# Patient Record
Sex: FEMALE | HISPANIC OR LATINO | Employment: OTHER | URBAN - METROPOLITAN AREA
[De-identification: names, ages, dates, MRNs, and addresses within clinical notes are randomized per-mention and may not be internally consistent; named-entity substitution may affect disease eponyms.]

---

## 2022-08-29 ENCOUNTER — OFFICE VISIT (OUTPATIENT)
Dept: URGENT CARE | Facility: CLINIC | Age: 42
End: 2022-08-29

## 2022-08-29 VITALS
SYSTOLIC BLOOD PRESSURE: 152 MMHG | RESPIRATION RATE: 18 BRPM | DIASTOLIC BLOOD PRESSURE: 84 MMHG | TEMPERATURE: 96.5 F | OXYGEN SATURATION: 100 % | HEART RATE: 103 BPM

## 2022-08-29 DIAGNOSIS — M79.662 PAIN OF LEFT CALF: Primary | ICD-10-CM

## 2022-08-29 PROCEDURE — 99213 OFFICE O/P EST LOW 20 MIN: CPT | Performed by: PHYSICIAN ASSISTANT

## 2022-08-29 NOTE — PROGRESS NOTES
[facility]        NAME: Tasia Thomson is a 43 y o  female  : 1980    MRN: [id number]  DATE: 2022  TIME: 5:36 PM    Assessment and Plan   Pain of left calf [M79 662]  1  Pain of left calf       Patient Instructions   Left calf pain improving  Could have been cyst on ligament that popped  RICE- rest, ice (20 min on, 20 min off), compression with ace bandage, and elevation while at home  Ibuprofen as needed for pain  If no improvement f/u with ortho     Follow up with PCP in 3-5 days  Proceed to  ER if symptoms worsen  Chief Complaint     Chief Complaint   Patient presents with    Leg Pain     C/O left calf pain x 1 1/2 months, today heard a "pop", and increased pain          History of Present Illness       Niyah Dodd is a 51-year-old female who presents to clinic complaining of left calf pain intermittently x2 months however this morning when she is walking she felt a pop in the left calf and then notice more consistent and increased pain  She denies any pain with movement or range of motion but notes increased pain with bearing weight and walking  She denies any swelling, bruising, or redness  Review of Systems   Review of Systems   Constitutional: Negative for chills and fever  Musculoskeletal: Positive for gait problem and myalgias  Negative for arthralgias and joint swelling  Skin: Negative for color change and wound  Current Medications     No current outpatient medications on file  Current Allergies     Allergies as of 2022    (No Known Allergies)            The following portions of the patient's history were reviewed and updated as appropriate: allergies, current medications, past family history, past medical history, past social history, past surgical history and problem list      History reviewed  No pertinent past medical history  History reviewed  No pertinent surgical history  No family history on file        Medications have been verified  Objective   /84   Pulse 103   Temp (!) 96 5 °F (35 8 °C)   Resp 18   LMP 08/19/2022   SpO2 100%   Patient's last menstrual period was 08/19/2022  Physical Exam     Physical Exam  Vitals and nursing note reviewed  Constitutional:       General: She is not in acute distress  Appearance: Normal appearance  She is not ill-appearing  Cardiovascular:      Rate and Rhythm: Normal rate and regular rhythm  Heart sounds: Normal heart sounds  Pulmonary:      Effort: Pulmonary effort is normal       Breath sounds: Normal breath sounds  Musculoskeletal:      Left knee: Normal       Left lower leg: Tenderness present  No swelling, deformity, lacerations or bony tenderness  No edema  Neurological:      Mental Status: She is alert and oriented to person, place, and time     Psychiatric:         Mood and Affect: Mood normal          Behavior: Behavior normal

## 2023-03-28 ENCOUNTER — TELEPHONE (OUTPATIENT)
Dept: PAIN MEDICINE | Facility: MEDICAL CENTER | Age: 43
End: 2023-03-28

## 2023-03-28 ENCOUNTER — OFFICE VISIT (OUTPATIENT)
Dept: FAMILY MEDICINE CLINIC | Facility: CLINIC | Age: 43
End: 2023-03-28

## 2023-03-28 VITALS
TEMPERATURE: 98.5 F | WEIGHT: 172.8 LBS | RESPIRATION RATE: 14 BRPM | DIASTOLIC BLOOD PRESSURE: 86 MMHG | SYSTOLIC BLOOD PRESSURE: 156 MMHG | HEIGHT: 60 IN | BODY MASS INDEX: 33.92 KG/M2 | HEART RATE: 98 BPM

## 2023-03-28 DIAGNOSIS — R03.0 ELEVATED BLOOD PRESSURE READING: ICD-10-CM

## 2023-03-28 DIAGNOSIS — M54.42 ACUTE LEFT-SIDED LOW BACK PAIN WITH LEFT-SIDED SCIATICA: Primary | ICD-10-CM

## 2023-03-28 DIAGNOSIS — M54.10 BACK PAIN WITH LEFT-SIDED RADICULOPATHY: ICD-10-CM

## 2023-03-28 DIAGNOSIS — M25.562 ACUTE PAIN OF LEFT KNEE: ICD-10-CM

## 2023-03-28 PROBLEM — M50.00 HERNIATED NUCLEUS PULPOSUS WITH MYELOPATHY, CERVICAL: Status: ACTIVE | Noted: 2023-03-28

## 2023-03-28 RX ORDER — MELOXICAM 15 MG/1
15 TABLET ORAL DAILY
Qty: 30 TABLET | Refills: 0 | Status: SHIPPED | OUTPATIENT
Start: 2023-03-28

## 2023-03-28 RX ORDER — PREGABALIN 50 MG/1
50 CAPSULE ORAL 3 TIMES DAILY
Qty: 90 CAPSULE | Refills: 0 | Status: SHIPPED | OUTPATIENT
Start: 2023-03-28

## 2023-03-28 RX ORDER — PREGABALIN 50 MG/1
CAPSULE ORAL
COMMUNITY
Start: 2023-03-24 | End: 2023-03-28 | Stop reason: SDUPTHER

## 2023-03-28 NOTE — TELEPHONE ENCOUNTER
Caller: Chelo Carlos     Doctor/Office: not spa     Call regarding :  Ortho    Call was transferred to: Ortho

## 2023-03-28 NOTE — PROGRESS NOTES
Clinic Visit Note  Yoseph Solorio 43 y o  female   MRN: [id number]    Assessment and Plan      Diagnoses and all orders for this visit:    Acute left-sided low back pain with left-sided sciatica  Appears to be left-sided lower back pain with sciatica, no muscle weakness, no red flag symptoms today on exam, recommend continuing Lyrica as needed up to 3 times a day, meloxicam for anti-inflammatory relief, physical therapy for 4-6 weeks, if symptoms do not improve recommend advanced imaging  Patient does have follow-up with orthopedic surgery later today, appreciate recommendations  -     XR spine lumbar minimum 4 views non injury; Future  -     Ambulatory Referral to Physical Therapy; Future  -     meloxicam (Mobic) 15 mg tablet; Take 1 tablet (15 mg total) by mouth daily  -     Ambulatory Referral to Orthopedic Surgery; Future    Elevated blood pressure reading  Blood pressure elevated today in the setting of pain, recheck next visit to see if antihypertensive medication appropriate  Acute pain of left knee  -     Ambulatory Referral to Orthopedic Surgery; Future    Other orders  -     pregabalin (LYRICA) 50 mg capsule; TAKE 1 CAPSULE BY MOUTH THREE TIMES DAILY AS NEEDED NERVE PAIN      My impressions and treatment recommendations were discussed in detail with the patient who verbalized understanding and had no further questions  Discharge instructions were provided  Subjective     Chief Complaint: New patient visit    History of Present Illness:    Patient is a 59-year-old female coming in new patient encounter secondary to lower back pain with left-sided sciatica symptoms, previously saw care now, given gabapentin with some relief  She does also have follow-up with orthopedic surgery later today      The following portions of the patient's history were reviewed and updated as appropriate: allergies, current medications, past family history, past medical history, past social history, past surgical history and problem list     REVIEW OF SYSTEMS:  A complete 12-point review of systems is negative other than that noted in the HPI  Review of Systems   Constitutional: Negative for chills, fatigue and fever  Respiratory: Negative for cough, shortness of breath and wheezing  Cardiovascular: Negative for chest pain, palpitations and leg swelling  Genitourinary: Negative for difficulty urinating and hematuria  Musculoskeletal: Positive for back pain and neck pain  Negative for gait problem  Skin: Negative for rash and wound  Neurological: Negative for dizziness and headaches  Psychiatric/Behavioral: Negative for agitation and confusion  Current Outpatient Medications:   •  meloxicam (Mobic) 15 mg tablet, Take 1 tablet (15 mg total) by mouth daily, Disp: 30 tablet, Rfl: 0  •  pregabalin (LYRICA) 50 mg capsule, Take 1 capsule (50 mg total) by mouth 3 (three) times a day, Disp: 90 capsule, Rfl: 0  History reviewed  No pertinent past medical history  History reviewed  No pertinent surgical history    Social History     Socioeconomic History   • Marital status: Unknown     Spouse name: Not on file   • Number of children: Not on file   • Years of education: Not on file   • Highest education level: Not on file   Occupational History   • Not on file   Tobacco Use   • Smoking status: Never   • Smokeless tobacco: Never   Vaping Use   • Vaping Use: Never used   Substance and Sexual Activity   • Alcohol use: Never   • Drug use: Never   • Sexual activity: Not on file   Other Topics Concern   • Not on file   Social History Narrative   • Not on file     Social Determinants of Health     Financial Resource Strain: Not on file   Food Insecurity: Not on file   Transportation Needs: Not on file   Physical Activity: Not on file   Stress: Not on file   Social Connections: Not on file   Intimate Partner Violence: Not on file   Housing Stability: Not on file     Family History   Problem Relation Age of Onset   • Stroke Mother    • Heart attack Mother    • Cancer Father    • Diabetes Maternal Grandmother      No Known Allergies    Objective     Vitals:    03/28/23 1353   BP: 156/86   BP Location: Left arm   Patient Position: Sitting   Cuff Size: Large   Pulse: 98   Resp: 14   Temp: 98 5 °F (36 9 °C)   TempSrc: Temporal   Weight: 78 4 kg (172 lb 12 8 oz)   Height: 5' (1 524 m)       Physical Exam:     GENERAL: NAD, pleasant   HEENT:  NC/AT, PERRL, EOMI, no scleral icterus  CARDIAC:  RRR, +S1/S2, no S3/S4 appreciated, no m/g/r  PULMONARY:  CTA B/L, no wheezing/rales/rhonci, non-labored breathing  ABDOMEN:  Soft, NT/ND, no rebound/guarding/rigidity  Extremities:  No edema, cyanosis, or clubbing, positive straight leg test left side  Musculoskeletal:  Full range of motion intact in all extremities   NEUROLOGIC: Grossly intact, no focal deficits  SKIN:  No rashes or erythema noted on exposed skin  Psych: Normal affect, mood stable    ==  PLEASE NOTE:  This encounter was completed utilizing the [employer] Direct Speech Voice Recognition Software  Grammatical errors, random word insertions, pronoun errors and incomplete sentences are occasional consequences of the system due to software limitations, ambient noise and hardware issues  These may be missed by proof reading prior to affixing electronic signature  Any questions or concerns about the content, text or information contained within the body of this dictation should be directly addressed to the physician for clarification  Please do not hesitate to call me directly if you have any any questions or concerns      DO Brian Chapman [facility] Internal Medicine   [facility]

## 2023-03-29 ENCOUNTER — TELEPHONE (OUTPATIENT)
Dept: OBGYN CLINIC | Facility: HOSPITAL | Age: 43
End: 2023-03-29

## 2023-03-29 NOTE — TELEPHONE ENCOUNTER
Caller: Nisa Dexter    Doctor: n/a    Reason for call: patient needed PT, transferred call    Call back#: [phone]

## 2023-04-06 ENCOUNTER — EVALUATION (OUTPATIENT)
Dept: PHYSICAL THERAPY | Facility: CLINIC | Age: 43
End: 2023-04-06

## 2023-04-06 DIAGNOSIS — M54.16 LUMBAR RADICULOPATHY: Primary | ICD-10-CM

## 2023-04-06 NOTE — PROGRESS NOTES
PT Evaluation     Today's date: 2023  Patient name: Huang Roque  : 1980  MRN: [id number]  Referring provider: Namrata Bird MD  Dx:   Encounter Diagnosis     ICD-10-CM    1  Lumbar radiculopathy  M54 16                      Assessment  Assessment details: 2023: Pt presents with constant L groin,calf pain and intermittent back/thigh pain signs and symptoms present for ,ptr than 16 days, are below to the knee which are not changing much in the past year and are consistent with derangement which would benefit from directional specific mechanical correction exercises  Symptoms respond with centralization to L buttock from L lateral knee upon mechanical assessment to determine directional preference of exension  Pt will benefit from skilled physical therapy 1-2X/week for 8-12 weeks to address deficits in range of motion, strength and function and return to PLOF by reaching short and long term goals  Impairments: abnormal or restricted ROM, activity intolerance, lacks appropriate home exercise program, pain with function, poor posture  and poor body mechanics  Barriers to therapy: High co pay  Understanding of Dx/Px/POC: good   Prognosis: good    Goals  Short Tem Goals to be met in 4 weeks (target date 2023)  1  Pt to be independent w/ HEP  2  Reduce c/o pain to 0-4/10 with activity and prolonged positions  3  Restore lumbar AROM to [facility] w/o pain  4  Pt to report pain to be intermittent vs current constant  Long Term Goal to be met in 12 weeks (target date 2023)  1  Pt to achieve full symptom prevention/resolution via HEP  2  Pt to resume PLOF regarding ADL's and IADL's, improving FOTO score to 59 or better and pain of 0-2/10  3  Pt to report sleep no longer disturbed by pain  4  Pt to demonstrate B/L LE strength of 5/5 to allow squatting/lifting w/ good mechanics        Plan  Patient would benefit from: skilled speech therapy  Planned modality interventions: traction, unattended electrical stimulation and thermotherapy: hydrocollator packs  Planned therapy interventions: joint mobilization, manual therapy, abdominal trunk stabilization, patient education, postural training, stretching, transfer training, home exercise program and strengthening  Frequency: 1-2x/week  Plan of Care beginning date: 2023  Plan of Care expiration date: 2023  Treatment plan discussed with: patient        Subjective Evaluation    History of Present Illness  Mechanism of injury: Subjective 2023: Pt is a  for [employer]  She does a lot of standing, lifting and carrying  Her back pain began about a year ago, in her L calf  It later spread to the back of her thigh and buttock  Then it spread to her groin  Recently it has spread across her low back  Recently the groin pain and calf pain are constant, and back/posterior thigh pain are intermittent  She denies R LE symptoms other than intermittent R toes paresthesias  Pain is worse after prolonged sitting  She cannot sleep on her side, so her sleep is disturbed if she tries to roll off her back  She reports less pain after standing for a while     Pain  At best pain rating: 10  At worst pain ratin  Quality: burning, sharp and dull ache (paresthesias)  Relieving factors: medications  Progression: improved (only w/ medication)    Social Support    Employment status: working ([employer] manager)  Treatments  Previous treatment: chiropractic (no better)  Current treatment: physical therapy  Patient Goals  Patient goals for therapy: decreased pain, independence with ADLs/IADLs and increased motion  Patient goal: sleep w/o waking due to pain        Objective  SPECIAL TESTS     2023  SLR supine:   (- R/+ L)  Crossed SLR:   (- R/- L)  Prone instability test:  (-)  Prone hip IR ROM:  (-r/+L)  Aberrant motion/Eleanor's: (+)  Supine to sit test:  (-)  [place] test prone:  (-)  Slump test:   (-R/+L)  Femoral NTT:   (-R/-L)      DERMATOMAL TESTING: pinprick     2023  L2 anterior thigh:  WNL B/L  L3 medial knee:  WNL B/L  L4 medial maleolus:  Dec L  L5 1st web space: WNL B/L  S1 lateral/sole foot:  Dec L  S2 poster calf:  Dec L  Hip exten:  R 4+/5; L 3+/5    MYOTOMAL TESTIN2023     Right  Left  L2-3 Hip flexion:  *  L3-4 Knee extension:    4+/5  L5 Great toe exten:    L4 Heel walk/DF:    S1 toe walk/PF/ever:    S2 knee flex:       REFLEXES: 0= none; 1+ = slight; 2+ = brisk/normal; 3+= very brisk; 4+= clonus     2023  L3-4 Quadriceps:  1+ b/l  L5-S1 Achilles:  1+ b/l    SPINAL/PIAVM ASSESSMENT/PALPATION:   2023: Lumbar P to A segmental mobility min/mod limited w/o pain  LUMBAR AROM: 2023  Flexion   Mod* oscar  Extension  mod oscar  R sideglide  min oscar  L sideglide  min oscar    MECHANICAL ASSESSMENT:   2023: pre-test findings include 6/10 L groin and lateral knee pain  Repeated extension in lying (REIL) 2x10 = centralizing to L buttock/B 3-4/10  Repeated EIL from qped 1x10 =     FUNCTION:  2023: Pt is limited with tolerance to sitting and sleep is disturbed by pain  Access Code: [email or web address]  URL: [email or web address]  Date: 2023  Prepared by: Ron Fallon    Exercises  - Quadruped Hip Drops  - 6 x daily - 10 reps    Precautions: History reviewed  No pertinent past medical history     SOC: 2023  FOTO: 2023  POC EXP 2023  DAILY TREATMENT LOG    Manuals 2023                                       Neuro Re-Ed        REIL 2X10       EIL from qped 1x10 (prefers)       PAWEL w/ B/L knee flexion 2x10       bridges        pirif stretch        Sciatic NG supine        Pt educ lumbar roll; importance of HEP freq, limit flexed postures; disc mechanics TT       Ther Ex                                                                        Ther Activity                        Gait Training                        Modalities

## 2023-04-12 ENCOUNTER — APPOINTMENT (OUTPATIENT)
Dept: PHYSICAL THERAPY | Facility: CLINIC | Age: 43
End: 2023-04-12

## 2023-04-13 ENCOUNTER — APPOINTMENT (OUTPATIENT)
Dept: PHYSICAL THERAPY | Facility: CLINIC | Age: 43
End: 2023-04-13

## 2023-04-19 ENCOUNTER — APPOINTMENT (OUTPATIENT)
Dept: PHYSICAL THERAPY | Facility: CLINIC | Age: 43
End: 2023-04-19

## 2023-04-21 ENCOUNTER — APPOINTMENT (OUTPATIENT)
Dept: PHYSICAL THERAPY | Facility: CLINIC | Age: 43
End: 2023-04-21

## 2023-04-24 ENCOUNTER — APPOINTMENT (OUTPATIENT)
Dept: PHYSICAL THERAPY | Facility: CLINIC | Age: 43
End: 2023-04-24

## 2023-04-26 ENCOUNTER — APPOINTMENT (OUTPATIENT)
Dept: PHYSICAL THERAPY | Facility: CLINIC | Age: 43
End: 2023-04-26

## 2023-05-12 ENCOUNTER — TELEPHONE (OUTPATIENT)
Dept: FAMILY MEDICINE CLINIC | Facility: CLINIC | Age: 43
End: 2023-05-12

## 2023-05-12 NOTE — TELEPHONE ENCOUNTER
I spoke to Patient she stated that the pain that she was seen for 04/18 is changing and wanted new medication  I asked her how was it changing and she stated sh had walked to the bathroom and the pain was so bad she almost passed out  Itold her that under thoae circumstances she should go to the ER or UC

## 2023-05-12 NOTE — TELEPHONE ENCOUNTER
Called pt and pt unavailable  Hello, my name is Terry Merida  My phone number is [phone]  Would like to talk to Doctor [facility] please  Thank you

## 2023-05-15 ENCOUNTER — OFFICE VISIT (OUTPATIENT)
Dept: FAMILY MEDICINE CLINIC | Facility: CLINIC | Age: 43
End: 2023-05-15

## 2023-05-15 VITALS
BODY MASS INDEX: 34.16 KG/M2 | SYSTOLIC BLOOD PRESSURE: 132 MMHG | RESPIRATION RATE: 14 BRPM | HEIGHT: 60 IN | HEART RATE: 112 BPM | WEIGHT: 174 LBS | OXYGEN SATURATION: 99 % | TEMPERATURE: 99.5 F | DIASTOLIC BLOOD PRESSURE: 90 MMHG

## 2023-05-15 DIAGNOSIS — M54.42 ACUTE LEFT-SIDED LOW BACK PAIN WITH LEFT-SIDED SCIATICA: Primary | ICD-10-CM

## 2023-05-15 DIAGNOSIS — G57.02 PIRIFORMIS SYNDROME OF LEFT SIDE: ICD-10-CM

## 2023-05-15 RX ORDER — DEXAMETHASONE SODIUM PHOSPHATE 4 MG/ML
4 INJECTION, SOLUTION INTRA-ARTICULAR; INTRALESIONAL; INTRAMUSCULAR; INTRAVENOUS; SOFT TISSUE ONCE
Status: COMPLETED | OUTPATIENT
Start: 2023-05-15 | End: 2023-05-15

## 2023-05-15 RX ADMIN — DEXAMETHASONE SODIUM PHOSPHATE 4 MG: 4 INJECTION, SOLUTION INTRA-ARTICULAR; INTRALESIONAL; INTRAMUSCULAR; INTRAVENOUS; SOFT TISSUE at 13:06

## 2023-05-15 NOTE — PROGRESS NOTES
Clinic Visit Note  Rashida Owens 43 y o  female   MRN: [id number]    Assessment and Plan      Diagnoses and all orders for this visit:    Acute left-sided low back pain with left-sided sciatica  Recommend physical therapy evaluation, dexamethasone shot in office today to help with inflammatory acute response, continue Lyrica/Mobic medical therapy  Lower back pain with sciatica versus piriformis syndrome on left side, recommend x-ray imaging to rule out acute hip pathology  If symptoms worsen or not responding reevaluation recommended  -     Ambulatory Referral to Physical Therapy; Future  -     dexamethasone (DECADRON) injection 4 mg  -     XR hip/pelv 2-3 vws left if performed; Future    Piriformis syndrome of left side  -     dexamethasone (DECADRON) injection 4 mg  -     XR hip/pelv 2-3 vws left if performed; Future      My impressions and treatment recommendations were discussed in detail with the patient who verbalized understanding and had no further questions  Discharge instructions were provided  Subjective     Chief Complaint: Follow-up visit    History of Present Illness:    Patient is a 40-year-old female follow-up visit secondary to left-sided lower back pain  Patient notes this has been flaring for the last 48 hours, has not responded to Mobic and Lyrica like previously  Patient did have one session of physical therapy previously  Has been following orthopedics out of network  The following portions of the patient's history were reviewed and updated as appropriate: allergies, current medications, past family history, past medical history, past social history, past surgical history and problem list     REVIEW OF SYSTEMS:  A complete 12-point review of systems is negative other than that noted in the HPI  Review of Systems   Constitutional: Negative for chills, fatigue and fever  Respiratory: Negative for cough, shortness of breath and wheezing      Cardiovascular: Negative for chest pain, palpitations and leg swelling  Musculoskeletal: Positive for back pain  Negative for gait problem and myalgias  Neurological: Negative for dizziness and headaches  Current Outpatient Medications:   •  meloxicam (Mobic) 15 mg tablet, Take 1 tablet (15 mg total) by mouth daily, Disp: 30 tablet, Rfl: 0  •  pregabalin (LYRICA) 75 mg capsule, , Disp: , Rfl:   No current facility-administered medications for this visit  History reviewed  No pertinent past medical history  History reviewed  No pertinent surgical history    Social History     Socioeconomic History   • Marital status: Unknown     Spouse name: Not on file   • Number of children: Not on file   • Years of education: Not on file   • Highest education level: Not on file   Occupational History   • Not on file   Tobacco Use   • Smoking status: Never   • Smokeless tobacco: Never   Vaping Use   • Vaping Use: Never used   Substance and Sexual Activity   • Alcohol use: Never   • Drug use: Never   • Sexual activity: Not on file   Other Topics Concern   • Not on file   Social History Narrative   • Not on file     Social Determinants of Health     Financial Resource Strain: Not on file   Food Insecurity: Not on file   Transportation Needs: Not on file   Physical Activity: Not on file   Stress: Not on file   Social Connections: Not on file   Intimate Partner Violence: Not on file   Housing Stability: Not on file     Family History   Problem Relation Age of Onset   • Stroke Mother    • Heart attack Mother    • Cancer Father    • Diabetes Maternal Grandmother      No Known Allergies    Objective     Vitals:    05/15/23 1242   BP: 132/90   BP Location: Left arm   Patient Position: Sitting   Cuff Size: Adult   Pulse: (!) 112   Resp: 14   Temp: 99 5 °F (37 5 °C)   TempSrc: Temporal   SpO2: 99%   Weight: 78 9 kg (174 lb)   Height: 5' (1 524 m)       Physical Exam:     GENERAL: NAD, pleasant   HEENT:  NC/AT, PERRL, EOMI, no scleral icterus  CARDIAC:  RRR, +S1/S2, no S3/S4 appreciated, no m/g/r  PULMONARY:  CTA B/L, no wheezing/rales/rhonci, non-labored breathing  ABDOMEN:  Soft, NT/ND, no rebound/guarding/rigidity  Extremities:  No edema, cyanosis, or clubbing  Musculoskeletal: Limited flexion with abduction left hip  NEUROLOGIC: Grossly intact, no focal deficits  SKIN:  No rashes or erythema noted on exposed skin  Psych: Normal affect, mood stable    ==  PLEASE NOTE:  This encounter was completed utilizing the [employer] Direct Speech Voice Recognition Software  Grammatical errors, random word insertions, pronoun errors and incomplete sentences are occasional consequences of the system due to software limitations, ambient noise and hardware issues  These may be missed by proof reading prior to affixing electronic signature  Any questions or concerns about the content, text or information contained within the body of this dictation should be directly addressed to the physician for clarification  Please do not hesitate to call me directly if you have any any questions or concerns      DO Brian Salinas [facility] Internal Medicine   [facility]

## 2023-05-19 ENCOUNTER — EVALUATION (OUTPATIENT)
Dept: PHYSICAL THERAPY | Facility: CLINIC | Age: 43
End: 2023-05-19

## 2023-05-19 DIAGNOSIS — M50.00 HERNIATED NUCLEUS PULPOSUS WITH MYELOPATHY, CERVICAL: Primary | ICD-10-CM

## 2023-05-19 NOTE — PROGRESS NOTES
PT Evaluation     Today's date: 2023  Patient name: Lexa Trinidad  : 1980  MRN: [id number]  Referring provider: Mohit Solis*  Dx:   Encounter Diagnosis     ICD-10-CM    1  Herniated nucleus pulposus with myelopathy, cervical  M50 00                      Assessment  Assessment details: Lexa Trinidad is an 43 y o  female  arriving to clinic with complaints of left sided sciatica secondary to lumbar radiculopathy  Patient with pain limiting strength, (+) SLR LLE, (+) slump test  Pain rated at 7/10 in standing reduced and centralized to 3/10 following 20x TOBI  Patient educated on importance of compliance with with HEP, patient agreeable  Due to current deficits, patient is limited functionally with ADL's, recreational activities, work-related activities, engaging in social activities, ambulation, stair negotiation, lifting/carrying, transfers  Patient has been educated in home exercise program and plan of care  Patient would benefit from skilled physical therapy services to address their aforementioned functional limitations and progress towards prior level of function and independence with home exercise program     Impairments: abnormal gait, abnormal or restricted ROM, activity intolerance, impaired physical strength, lacks appropriate home exercise program, pain with function, weight-bearing intolerance and poor posture     Symptom irritability: high  Goals  Short Term Goals:  4 weeks  1  Initiate and advance home exercise program to self manage symptoms  2  Improve postural awareness and demonstrate self postural correction  3  Improve AROM lumbar spine to minimal limitation or better to improve mobility   4  Patient to reduce pain at worst to 4/10 at worst in LLE to improve activity tolerance  Long Term Goals:  12 weeks  1  Patient to exhibit full independence with home exercise program for symptoms relief and prevention   2   Patient to achieve lumbar ROM to [facility] without increased pain  3  Improve LE strength to 5/5 grossly to improve general mobility  4  Patient to reduce symptoms to 1/10 pain at worst in LLE    Plan  Patient would benefit from: skilled physical therapy  Planned modality interventions: TENS, unattended electrical stimulation, thermotherapy: hydrocollator packs and cryotherapy  Planned therapy interventions: abdominal trunk stabilization, flexibility, functional ROM exercises, home exercise program, therapeutic exercise, therapeutic activities, stretching, strengthening, self care, postural training, patient education, neuromuscular re-education, massage, manual therapy and joint mobilization  Frequency: 2x week  Duration in weeks: 12  Treatment plan discussed with: patient        Subjective Evaluation    History of Present Illness  Mechanism of injury: Patient reports she has a chronic history of left lower extremity pain down into her left calf  Patient notes that pain started about a year ago  Patient went to urgent care however was not given any medication  Patient reports symptoms are worse with sitting, standing, walking  Patient followed up with her PCP who recommended physical therapy  Patient went 1x however was not consistent  Patient returned back to her PCP who referred patient x-ray that was unremarkable  Patient was referred again to physical therapy            Recurrent probem    Pain  Current pain ratin  At best pain ratin  At worst pain rating: 10  Quality: radiating, discomfort, dull ache and sharp  Aggravating factors: sitting, standing and walking  Progression: worsening    Social Support    Employment status: working    Diagnostic Tests  X-ray: normal  Treatments  Previous treatment: physical therapy  Patient Goals  Patient goals for therapy: increased motion, decreased pain and increased strength          Objective  POSTURE:   Standing: Increased lumbar lordosis  Sitting: Increased lumbar lordosis     Lumbar AROM limitations:  (* = Pain)    Flexion                        Mod oscar*  Extension                    mod oscar  R sideglide                  min oscar  L sideglide                   min oscar        Mechanical Asessment: pre-test symtpoms include L groin and lateral knee pain                                                                Repeated extension in lying (REIL) 1x10 = Centralization to left posterior hip   Repeated TOBI 1x10 =  Centralization to posterior hip     Strength:                                         Right                Left     Hip flexion:           5/5                   3+/5*   Knee extension:   5/5                   4/5*   Great toe exten:      5/5                   5/5   Dorsiflexion:         5/5                   5/5   Plantarflexion:    5/5                   5/5   Knee flex:               5/5                   5/5      SPECIAL TESTS:                                  SLR supine:( +) L  Crossed SLR: (-)  Prone instability test: (-)  Slump test: (+) L minimal  Femoral NTT: (-)      FUNCTION:    Prolonged sitting, sleeping    DTR's:   Patella R: 1+  Patella L: 1+  Achilles R: 1+  Achilles L: 1+       Precautions: History reviewed  No pertinent past medical history  Date: 05/19/2023       Visit # 1 Eval       Manuals                                        Neuro Re-Ed                                                                Ther Ex        PAWEL        PAWEL with knee bends        TOBI                                                Ther Activity                        Gait Training                        Modalities                          Access Code: [email or web address]  URL: [email or web address]  Date: 05/19/2023  Prepared by: Lars Skelton    Exercises  - Standing Lumbar Extension with Counter  - 4 x daily - 7 x weekly - 2 sets - 10 reps

## 2023-05-23 ENCOUNTER — OFFICE VISIT (OUTPATIENT)
Dept: PHYSICAL THERAPY | Facility: CLINIC | Age: 43
End: 2023-05-23

## 2023-05-23 DIAGNOSIS — M50.00 HERNIATED NUCLEUS PULPOSUS WITH MYELOPATHY, CERVICAL: Primary | ICD-10-CM

## 2023-05-23 NOTE — PROGRESS NOTES
"Daily Note     Today's date: 2023  Patient name: Kemar Alfaro  : 1980  MRN: [id number]  Referring provider: Abril Jackson*  Dx:   Encounter Diagnosis     ICD-10-CM    1  Herniated nucleus pulposus with myelopathy, cervical  M50 00                      Subjective: Patient reports that she has been compliant with HEP and states symptoms have improved somewhat noting that the acuity has not been as bad  Objective: See treatment diary below      Assessment: Tolerated treatment fair  Patient was guarded during session presenting with apprehension for movement in expectation of pain, no pain during session  Patient exhibited good technique with therapeutic exercises and would benefit from continued PT      Plan: Continue per plan of care  Precautions: History reviewed  No pertinent past medical history  Date: 2023      Visit # 1 Eval 2      Manuals        Sciatic nerve glides  LLE 20x                               Neuro Re-Ed        Sidelying clamshells  In right sidelying 2x10      Supine clamshells  GTB 20x with cues for slowed eccentric and equal force      Bridging  2x10                                      Ther Ex        TOBI  20x beginning of session  20x end of session      PAWEL with knee bends  Bilateral knee bends 20x      SLR  LLE 10x2       Adduction squeeze  3\" holds 20x      LTR  20x with adduction squeeze      Patient education  HEP, exercise progression                      Ther Activity                        Gait Training                        Modalities                          Access Code: [email or web address]  URL: [email or web address]  Date: 2023  Prepared by: Rene Arms    Exercises  - Standing Lumbar Extension with Counter  - 4 x daily - 7 x weekly - 2 sets - 10 reps         "

## 2023-05-28 DIAGNOSIS — M54.42 ACUTE LEFT-SIDED LOW BACK PAIN WITH LEFT-SIDED SCIATICA: ICD-10-CM

## 2023-05-30 RX ORDER — MELOXICAM 15 MG/1
TABLET ORAL
Qty: 30 TABLET | Refills: 0 | Status: SHIPPED | OUTPATIENT
Start: 2023-05-30

## 2023-05-31 ENCOUNTER — TELEPHONE (OUTPATIENT)
Dept: FAMILY MEDICINE CLINIC | Facility: CLINIC | Age: 43
End: 2023-05-31

## 2023-05-31 NOTE — TELEPHONE ENCOUNTER
Patient left voicemail requesting a refill of her muscle relaxer however I reviewed the pts chart she is not taking one  Dr Kimberly Jenkins did send over [employer] for the Patient I tried to phone the Patient but there was no answer nor a vm to leave a message

## 2023-06-01 ENCOUNTER — APPOINTMENT (OUTPATIENT)
Dept: PHYSICAL THERAPY | Facility: CLINIC | Age: 43
End: 2023-06-01
Payer: COMMERCIAL

## 2023-06-05 ENCOUNTER — OFFICE VISIT (OUTPATIENT)
Dept: PHYSICAL THERAPY | Facility: CLINIC | Age: 43
End: 2023-06-05
Payer: COMMERCIAL

## 2023-06-05 DIAGNOSIS — M50.00 HERNIATED NUCLEUS PULPOSUS WITH MYELOPATHY, CERVICAL: Primary | ICD-10-CM

## 2023-06-05 PROCEDURE — 97112 NEUROMUSCULAR REEDUCATION: CPT | Performed by: PHYSICAL MEDICINE & REHABILITATION

## 2023-06-05 PROCEDURE — 97140 MANUAL THERAPY 1/> REGIONS: CPT | Performed by: PHYSICAL MEDICINE & REHABILITATION

## 2023-06-05 PROCEDURE — 97110 THERAPEUTIC EXERCISES: CPT | Performed by: PHYSICAL MEDICINE & REHABILITATION

## 2023-06-05 NOTE — PROGRESS NOTES
"Daily Note     Today's date: 2023  Patient name: Gurpreet Wade  : 1980  MRN: [id number]  Referring provider: Marjan Feliz*  Dx:   Encounter Diagnosis     ICD-10-CM    1  Herniated nucleus pulposus with myelopathy, cervical  M50 00                      Subjective: Patient reports symptoms are better by about 15%  Patient reports that her left leg continues to feel heavy and states that there is pain when she lifts her left leg  Objective: See treatment diary below      Assessment: Tolerated treatment fair  Patient does respond well to lumbar extension, however does continue to be very pain sensitive  Patient exhibited good technique with therapeutic exercises and would benefit from continued PT      Plan: Continue per plan of care  Precautions: History reviewed  No pertinent past medical history  Date: 2023     Visit # 1 Eval 2 3     Manuals        Sciatic nerve glides  LLE 20x  LLE gently 20x                             Neuro Re-Ed        Sidelying clamshells  In right sidelying 2x10 ---     Supine clamshells  GTB 20x with cues for slowed eccentric and equal force GTB 20x with cues for slowed eccentric and equal force     Bridging  2x10 2x10                                     Ther Ex        TOBI  20x beginning of session  20x end of session 20x beginning of session  20x end of session     PAWEL with knee bends  Bilateral knee bends 20x PAWEL 2', 10x knee bends     SLR  LLE 10x2  LLE 10x2      Adduction squeeze  3\" holds 20x 3\" holds 20x     LTR  20x with adduction squeeze 20x with adduction squeeze     FSU/LSU   6\" 2x10 R             Patient education  HEP, exercise progression                      Ther Activity                        Gait Training                        Modalities                          Access Code: [email or web address]  URL: [email or web address]  Date: 2023  Prepared by: Vitor Hutchinson    Exercises  - Standing Lumbar " Extension with Counter  - 4 x daily - 7 x weekly - 2 sets - 10 reps

## 2023-06-12 ENCOUNTER — OFFICE VISIT (OUTPATIENT)
Dept: PHYSICAL THERAPY | Facility: CLINIC | Age: 43
End: 2023-06-12
Payer: COMMERCIAL

## 2023-06-12 DIAGNOSIS — M50.00 HERNIATED NUCLEUS PULPOSUS WITH MYELOPATHY, CERVICAL: Primary | ICD-10-CM

## 2023-06-12 PROCEDURE — 97112 NEUROMUSCULAR REEDUCATION: CPT | Performed by: PHYSICAL MEDICINE & REHABILITATION

## 2023-06-12 PROCEDURE — 97110 THERAPEUTIC EXERCISES: CPT | Performed by: PHYSICAL MEDICINE & REHABILITATION

## 2023-06-12 PROCEDURE — 97140 MANUAL THERAPY 1/> REGIONS: CPT | Performed by: PHYSICAL MEDICINE & REHABILITATION

## 2023-06-12 NOTE — PROGRESS NOTES
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 she began to have flair ups of symptoms because of sitting  Objective: See treatment diary below      Assessment: Tolerated treatment fair  Patient with poor tolerance to sciatic nerve glides today, no significant improvement in LLE symptoms following PAWEL, or TOBI  Patient is able to complete functional strengthening activities that did result in improvement in symptoms acutely  Advised patient on updated HEP  Patient exhibited good technique with therapeutic exercises and would benefit from continued PT      Plan: Continue per plan of care  Precautions: History reviewed  No pertinent past medical history      Date: 2023    Visit # 1 Eval 2 3 4    Manuals        Sciatic nerve glides  LLE 20x  LLE gently 20x 10x poor tolerance                            Neuro Re-Ed        Sidelying clamshells  In right sidelying 2x10 --- In right sidelying 2x10    Supine clamshells  GTB 20x with cues for slowed eccentric and equal force GTB 20x with cues for slowed eccentric and equal force GTB 20x    Bridging  2x10 2x10 2x10 with adduction squeeze                                    Ther Ex        TOBI  20x beginning of session  20x end of session 20x beginning of session  20x end of session 20x beginning of session  20x end of session    PAWEL with knee bends  Bilateral knee bends 20x PAWEL 2', 10x knee bends PAWLE 2', 10x knee bends    SLR  LLE 10x2  LLE 10x2      Adduction squeeze  3\" holds 20x 3\" holds 20x 3\" holds 20x    LTR  20x " "with adduction squeeze 20x with adduction squeeze 20x with adduction squeeze    FSU/LSU   6\" 2x10 R 6\" 2x10 R    Standing hip abd/ext    No resistance 20x each R/L    Heel raises    On half foam 20x    STS    From chair with airex 10x    Patient education  HEP, exercise progression                      Ther Activity                        Gait Training                        Modalities                          Access Code: [email or web address]  URL: [email or web address]  Date: 06/12/2023  Prepared by: Dave Luna    Exercises  - Standing Lumbar Extension with Counter  - 4 x daily - 7 x weekly - 2 sets - 10 reps  - Sit to Stand  - 1 x daily - 7 x weekly - 1 sets - 10 reps  - Standing Hip Abduction with Counter Support  - 1 x daily - 7 x weekly - 2 sets - 10 reps  - Standing Hip Extension with Counter Support  - 1 x daily - 7 x weekly - 2 sets - 10 reps  - Supine Lower Trunk Rotation  - 1 x daily - 7 x weekly - 2 sets - 10 reps  - Supine Bridge  - 1 x daily - 7 x weekly - 2 sets - 10 reps         "

## 2023-06-19 ENCOUNTER — OFFICE VISIT (OUTPATIENT)
Dept: PHYSICAL THERAPY | Facility: CLINIC | Age: 43
End: 2023-06-19
Payer: COMMERCIAL

## 2023-06-19 DIAGNOSIS — M50.00 HERNIATED NUCLEUS PULPOSUS WITH MYELOPATHY, CERVICAL: Primary | ICD-10-CM

## 2023-06-19 PROCEDURE — 97110 THERAPEUTIC EXERCISES: CPT | Performed by: PHYSICAL MEDICINE & REHABILITATION

## 2023-06-19 PROCEDURE — 97112 NEUROMUSCULAR REEDUCATION: CPT | Performed by: PHYSICAL MEDICINE & REHABILITATION

## 2023-06-19 PROCEDURE — 97140 MANUAL THERAPY 1/> REGIONS: CPT | Performed by: PHYSICAL MEDICINE & REHABILITATION

## 2023-06-19 NOTE — PROGRESS NOTES
"Daily Note     Today's date: 2023  Patient name: Bon Lindsay  : 1980  MRN: [id number]  Referring provider: Gela Massey*  Dx:   Encounter Diagnosis     ICD-10-CM    1  Herniated nucleus pulposus with myelopathy, cervical  M50 00                      Subjective: Patient reports symptoms continue especially with work related activities  Patient states prolonged sitting worsens symptoms  Objective: See treatment diary below      Assessment: Tolerated treatment fair  Patient presents with increased pain when moving into hip IR at >90 degrees of hip flexion with pain down leg, advised patient on continuing to strengthen hips with HEP  Patient exhibited good technique with therapeutic exercises and would benefit from continued PT      Plan: Continue per plan of care  Precautions: History reviewed  No pertinent past medical history      Date: 2023   Visit # 1 Eval 2 3 4 5    Manuals        Sciatic nerve glides  LLE 20x  LLE gently 20x 10x poor tolerance                            Neuro Re-Ed        Sidelying clamshells  In right sidelying 2x10 --- In right sidelying 2x10    Supine clamshells  GTB 20x with cues for slowed eccentric and equal force GTB 20x with cues for slowed eccentric and equal force GTB 20x GTB 20x   Bridging  2x10 2x10 2x10 with adduction squeeze 2x10 with adduction squeeze   Piriformis stretch     20\" holds 3x                           Ther Ex        TOBI  20x beginning of session  20x end of session 20x beginning of session  20x end of session 20x beginning of session  20x end of session 20x beginning of session  20x end of session   PAWEL with knee bends  Bilateral knee bends 20x PAWEL 2', 10x knee bends PAWEL 2', 10x knee bends PAWEL 2', 10x knee bends   SLR  LLE 10x2  LLE 10x2      Adduction squeeze  3\" holds 20x 3\" holds 20x 3\" holds 20x 3\" holds 20x   LTR  20x with adduction squeeze 20x with adduction squeeze 20x with " "adduction squeeze 20x with adduction squeeze   FSU/LSU   6\" 2x10 R 6\" 2x10 R 6\" 2x10 R   Standing hip abd/ext    No resistance 20x each R/L No resistance 20x each R/L   Heel raises    On half foam 20x    STS    From chair with airex 10x From chair with airex 10x   Patient education  HEP, exercise progression                      Ther Activity                        Gait Training                        Modalities                          Access Code: [email or web address]  URL: [email or web address]  Date: 06/12/2023  Prepared by: Jose Seth    Exercises  - Standing Lumbar Extension with Counter  - 4 x daily - 7 x weekly - 2 sets - 10 reps  - Sit to Stand  - 1 x daily - 7 x weekly - 1 sets - 10 reps  - Standing Hip Abduction with Counter Support  - 1 x daily - 7 x weekly - 2 sets - 10 reps  - Standing Hip Extension with Counter Support  - 1 x daily - 7 x weekly - 2 sets - 10 reps  - Supine Lower Trunk Rotation  - 1 x daily - 7 x weekly - 2 sets - 10 reps  - Supine Bridge  - 1 x daily - 7 x weekly - 2 sets - 10 reps         "

## 2023-06-22 ENCOUNTER — APPOINTMENT (OUTPATIENT)
Dept: PHYSICAL THERAPY | Facility: CLINIC | Age: 43
End: 2023-06-22
Payer: COMMERCIAL

## 2023-06-27 DIAGNOSIS — M54.42 ACUTE LEFT-SIDED LOW BACK PAIN WITH LEFT-SIDED SCIATICA: ICD-10-CM

## 2023-06-27 RX ORDER — MELOXICAM 15 MG/1
15 TABLET ORAL DAILY
Qty: 30 TABLET | Refills: 0 | Status: SHIPPED | OUTPATIENT
Start: 2023-06-27

## 2023-06-28 ENCOUNTER — OFFICE VISIT (OUTPATIENT)
Dept: PHYSICAL THERAPY | Facility: CLINIC | Age: 43
End: 2023-06-28
Payer: COMMERCIAL

## 2023-06-28 DIAGNOSIS — M50.00 HERNIATED NUCLEUS PULPOSUS WITH MYELOPATHY, CERVICAL: Primary | ICD-10-CM

## 2023-06-28 PROCEDURE — 97112 NEUROMUSCULAR REEDUCATION: CPT | Performed by: PHYSICAL MEDICINE & REHABILITATION

## 2023-06-28 PROCEDURE — 97110 THERAPEUTIC EXERCISES: CPT | Performed by: PHYSICAL MEDICINE & REHABILITATION

## 2023-06-28 NOTE — PROGRESS NOTES
"Daily Note     Today's date: 2023  Patient name: Vincenzo Williamson  : 1980  MRN: [id number]  Referring provider: Kenia Holbrook*  Dx:   Encounter Diagnosis     ICD-10-CM    1  Herniated nucleus pulposus with myelopathy, cervical  M50 00                      Subjective: Patient reports that she can feel her LLE getting stronger with activities such as stair negotiation and transfers of which she was having difficulties with in the past  Patient reports however pain down her left leg persists with sitting during her commute and when while at work  Objective: See treatment diary below      Assessment: Tolerated treatment fair  Patient has responded well to improved function with physical therapy however pain is persistent  Advised patient to follow up with MD due to persistent symptoms of pain  Patient exhibited good technique with therapeutic exercises and would benefit from continued PT      Plan: Continue per plan of care  Precautions: History reviewed  No pertinent past medical history      Date: 2023   Visit # 6 2 3 4 5    Manuals        Sciatic nerve glides  LLE 20x  LLE gently 20x 10x poor tolerance    LLE stretching L piriformis                       Neuro Re-Ed        Sidelying clamshells  In right sidelying 2x10 --- In right sidelying 2x10    Supine clamshells GTB 20x GTB 20x with cues for slowed eccentric and equal force GTB 20x with cues for slowed eccentric and equal force GTB 20x GTB 20x   Bridging 2x10 with adduction squeeze 2x10 2x10 2x10 with adduction squeeze 2x10 with adduction squeeze   Piriformis stretch 20\" holds 3x    20\" holds 3x                           Ther Ex        TOBI 20x beginning of session  20x end of session 20x beginning of session  20x end of session 20x beginning of session  20x end of session 20x beginning of session  20x end of session 20x beginning of session  20x end of session   PAWEL with knee " "bends PAWEL 2', 10x knee bends Bilateral knee bends 20x PAWEL 2', 10x knee bends PAWEL 2', 10x knee bends PAWEL 2', 10x knee bends   SLR  LLE 10x2  LLE 10x2      Adduction squeeze 3\" holds 20x 3\" holds 20x 3\" holds 20x 3\" holds 20x 3\" holds 20x   LTR 20x with adduction squeeze 20x with adduction squeeze 20x with adduction squeeze 20x with adduction squeeze 20x with adduction squeeze   FSU/LSU 6\" 2x10 R  6\" 2x10 R 6\" 2x10 R 6\" 2x10 R   Standing hip abd/ext No resistance 20x each R/L   No resistance 20x each R/L No resistance 20x each R/L   Heel raises From flat 20x   On half foam 20x    STS From chair 20x   From chair with airex 10x From chair with airex 10x   Patient education  HEP, exercise progression                      Ther Activity                        Gait Training                        Modalities                          Access Code: [email or web address]  URL: [email or web address]  Date: 06/12/2023  Prepared by: Justa Garza    Exercises  - Standing Lumbar Extension with Counter  - 4 x daily - 7 x weekly - 2 sets - 10 reps  - Sit to Stand  - 1 x daily - 7 x weekly - 1 sets - 10 reps  - Standing Hip Abduction with Counter Support  - 1 x daily - 7 x weekly - 2 sets - 10 reps  - Standing Hip Extension with Counter Support  - 1 x daily - 7 x weekly - 2 sets - 10 reps  - Supine Lower Trunk Rotation  - 1 x daily - 7 x weekly - 2 sets - 10 reps  - Supine Bridge  - 1 x daily - 7 x weekly - 2 sets - 10 reps         "

## 2023-09-19 ENCOUNTER — OFFICE VISIT (OUTPATIENT)
Dept: FAMILY MEDICINE CLINIC | Facility: CLINIC | Age: 43
End: 2023-09-19
Payer: COMMERCIAL

## 2023-09-19 VITALS
RESPIRATION RATE: 14 BRPM | TEMPERATURE: 98.6 F | DIASTOLIC BLOOD PRESSURE: 86 MMHG | SYSTOLIC BLOOD PRESSURE: 138 MMHG | BODY MASS INDEX: 33.38 KG/M2 | HEIGHT: 60 IN | HEART RATE: 92 BPM | WEIGHT: 170 LBS

## 2023-09-19 DIAGNOSIS — E55.9 VITAMIN D DEFICIENCY: ICD-10-CM

## 2023-09-19 DIAGNOSIS — Z13.29 THYROID DISORDER SCREENING: ICD-10-CM

## 2023-09-19 DIAGNOSIS — E53.8 B12 DEFICIENCY: ICD-10-CM

## 2023-09-19 DIAGNOSIS — Z13.1 DIABETES MELLITUS SCREENING: ICD-10-CM

## 2023-09-19 DIAGNOSIS — Z13.0 SCREENING, IRON DEFICIENCY ANEMIA: ICD-10-CM

## 2023-09-19 DIAGNOSIS — M25.552 CHRONIC LEFT HIP PAIN: Primary | ICD-10-CM

## 2023-09-19 DIAGNOSIS — E87.8 ELECTROLYTE ABNORMALITY: ICD-10-CM

## 2023-09-19 DIAGNOSIS — Z11.59 NEED FOR HEPATITIS C SCREENING TEST: ICD-10-CM

## 2023-09-19 DIAGNOSIS — G89.29 CHRONIC LEFT HIP PAIN: Primary | ICD-10-CM

## 2023-09-19 DIAGNOSIS — Z13.220 SCREENING CHOLESTEROL LEVEL: ICD-10-CM

## 2023-09-19 PROCEDURE — 99214 OFFICE O/P EST MOD 30 MIN: CPT | Performed by: STUDENT IN AN ORGANIZED HEALTH CARE EDUCATION/TRAINING PROGRAM

## 2023-09-19 RX ORDER — OMEPRAZOLE 20 MG/1
20 CAPSULE, DELAYED RELEASE ORAL DAILY
COMMUNITY
Start: 2023-07-27 | End: 2023-09-19

## 2023-09-19 NOTE — PROGRESS NOTES
Clinic Visit Note  Julito Oliver 37 y.o. female   MRN: [id number]    Assessment and Plan      Diagnoses and all orders for this visit:    Chronic left hip pain  Continue Mobic/Lyrica, will be following up with orthopedic hip specialist on October 3, appreciate recommendations, will most likely need some type of surgical procedure. Continue conservative management at this time, recommend yearly blood work prior to annual visit in 1 month. Need for hepatitis C screening test  -     Hepatitis C Antibody; Future    Thyroid disorder screening  -     TSH, 3rd generation with Free T4 reflex; Future    Diabetes mellitus screening  -     Hemoglobin A1C; Future    Screening, iron deficiency anemia  -     CBC and differential; Future    B12 deficiency  -     Vitamin B12; Future    Vitamin D deficiency  -     Vitamin D 25 hydroxy; Future    Electrolyte abnormality  -     Comprehensive metabolic panel; Future    Screening cholesterol level  -     Lipid panel; Future    My impressions and treatment recommendations were discussed in detail with the patient who verbalized understanding and had no further questions. Discharge instructions were provided. Subjective     Chief Complaint: F/U    History of Present Illness:    Patient is a 12-year-old female coming in for follow-up on left hip pain, will be seeing orthopedic specialist on October 3, significant pain at work. The following portions of the patient's history were reviewed and updated as appropriate: allergies, current medications, past family history, past medical history, past social history, past surgical history and problem list.    REVIEW OF SYSTEMS:  A complete 12-point review of systems is negative other than that noted in the HPI. Review of Systems   Constitutional: Negative for chills and fever. HENT: Negative for ear pain and sore throat. Eyes: Negative for pain and visual disturbance. Respiratory: Negative for cough and shortness of breath. Cardiovascular: Negative for chest pain and palpitations. Gastrointestinal: Negative for abdominal pain and vomiting. Genitourinary: Negative for dysuria and hematuria. Musculoskeletal: Positive for back pain. Negative for arthralgias. Chronic left hip pain   Skin: Negative for color change and rash. Neurological: Negative for seizures and syncope. All other systems reviewed and are negative. Current Outpatient Medications:   •  meloxicam (MOBIC) 15 mg tablet, Take 1 tablet (15 mg total) by mouth daily, Disp: 30 tablet, Rfl: 0  •  pregabalin (LYRICA) 75 mg capsule, Take 75 mg by mouth daily, Disp: , Rfl:   History reviewed. No pertinent past medical history. History reviewed. No pertinent surgical history.   Social History     Socioeconomic History   • Marital status: Unknown     Spouse name: Not on file   • Number of children: Not on file   • Years of education: Not on file   • Highest education level: Not on file   Occupational History   • Not on file   Tobacco Use   • Smoking status: Never   • Smokeless tobacco: Never   Vaping Use   • Vaping Use: Never used   Substance and Sexual Activity   • Alcohol use: Never   • Drug use: Never   • Sexual activity: Not on file   Other Topics Concern   • Not on file   Social History Narrative   • Not on file     Social Determinants of Health     Financial Resource Strain: Not on file   Food Insecurity: Not on file   Transportation Needs: Not on file   Physical Activity: Not on file   Stress: Not on file   Social Connections: Not on file   Intimate Partner Violence: Not on file   Housing Stability: Not on file     Family History   Problem Relation Age of Onset   • Stroke Mother    • Heart attack Mother    • Cancer Father    • Diabetes Maternal Grandmother      No Known Allergies    Objective     Vitals:    09/19/23 1007   BP: 138/86   BP Location: Left arm   Patient Position: Sitting   Cuff Size: Adult   Pulse: 92   Resp: 14   Temp: 98.6 °F (37 °C) TempSrc: Temporal   Weight: 77.1 kg (170 lb)   Height: 5' (1.524 m)       Physical Exam:     GENERAL: NAD, pleasant   HEENT:  NC/AT, PERRL, EOMI, no scleral icterus  CARDIAC:  RRR, +S1/S2, no S3/S4 appreciated, no m/g/r  PULMONARY:  CTA B/L, no wheezing/rales/rhonci, non-labored breathing  ABDOMEN:  Soft, NT/ND, no rebound/guarding/rigidity  Extremities:. No edema, cyanosis, or clubbing  Musculoskeletal:  Full range of motion intact in all extremities  NEUROLOGIC: Grossly intact, no focal deficits  SKIN:  No rashes or erythema noted on exposed skin  Psych: Normal affect, mood stable    ==  PLEASE NOTE:  This encounter was completed utilizing the [employer] Direct Speech Voice Recognition Software. Grammatical errors, random word insertions, pronoun errors and incomplete sentences are occasional consequences of the system due to software limitations, ambient noise and hardware issues. These may be missed by proof reading prior to affixing electronic signature. Any questions or concerns about the content, text or information contained within the body of this dictation should be directly addressed to the physician for clarification. Please do not hesitate to call me directly if you have any any questions or concerns.     DO [facility]   [facility]

## 2023-09-19 NOTE — LETTER
September 19, 2023     Patient: Parish Still  YOB: 1980  Date of Visit: 9/19/2023      To Whom it May Concern:    Parish Still is under my professional care. Jennifer Alfonso was seen in my office on 9/19/2023. Jennifer Alfonso may return to work on 09/26/2023 . If you have any questions or concerns, please don't hesitate to call.          Sincerely,          Adalid Lora,         CC: No Recipients

## 2023-09-28 DIAGNOSIS — M54.42 ACUTE LEFT-SIDED LOW BACK PAIN WITH LEFT-SIDED SCIATICA: ICD-10-CM

## 2023-09-28 RX ORDER — MELOXICAM 15 MG/1
15 TABLET ORAL DAILY
Qty: 30 TABLET | Refills: 0 | Status: SHIPPED | OUTPATIENT
Start: 2023-09-28

## 2023-10-25 DIAGNOSIS — M54.42 ACUTE LEFT-SIDED LOW BACK PAIN WITH LEFT-SIDED SCIATICA: ICD-10-CM

## 2023-10-25 RX ORDER — MELOXICAM 15 MG/1
15 TABLET ORAL DAILY
Qty: 30 TABLET | Refills: 1 | Status: SHIPPED | OUTPATIENT
Start: 2023-10-25

## 2023-12-20 NOTE — TELEPHONE ENCOUNTER
Patient would like a call when this is taken care.     Reason for call:   [x] Refill   [] Prior Auth  [] Other:     Office:   [x] PCP/Provider -   [] Specialty/Provider -     Medication: Pregabalin 75mg capsule- take by mouth daily     [facility]: [facility]    Does the patient have enough for 3 days?   [] Yes   [x] No - Send as HP to POD

## 2023-12-21 RX ORDER — PREGABALIN 75 MG/1
75 CAPSULE ORAL DAILY
Qty: 90 CAPSULE | Refills: 1 | OUTPATIENT
Start: 2023-12-21

## 2023-12-21 NOTE — TELEPHONE ENCOUNTER
Called patient. She confirmed that ortho Dr Cuellar has been prescribing this med, she is not sure dosage. Called [facility] and spoke with Vic- they will send refill request to Dr Cuellar. Please close out refill request.

## 2024-03-19 ENCOUNTER — TELEPHONE (OUTPATIENT)
Age: 44
End: 2024-03-19

## 2024-03-19 NOTE — TELEPHONE ENCOUNTER
Pt needed an appt and will come in on 3/22 with PCP . Please call pt to verify insurance and self pay cost. Pt was made aware she may or may not be a self pay. Please advise.

## 2024-03-22 ENCOUNTER — OFFICE VISIT (OUTPATIENT)
Dept: FAMILY MEDICINE CLINIC | Facility: CLINIC | Age: 44
End: 2024-03-22
Payer: COMMERCIAL

## 2024-03-22 VITALS
SYSTOLIC BLOOD PRESSURE: 140 MMHG | BODY MASS INDEX: 32.79 KG/M2 | RESPIRATION RATE: 14 BRPM | DIASTOLIC BLOOD PRESSURE: 80 MMHG | WEIGHT: 167 LBS | HEIGHT: 60 IN | HEART RATE: 88 BPM | TEMPERATURE: 98 F

## 2024-03-22 DIAGNOSIS — M25.552 CHRONIC LEFT HIP PAIN: Primary | ICD-10-CM

## 2024-03-22 DIAGNOSIS — E87.8 ELECTROLYTE ABNORMALITY: ICD-10-CM

## 2024-03-22 DIAGNOSIS — E55.9 VITAMIN D DEFICIENCY: ICD-10-CM

## 2024-03-22 DIAGNOSIS — E53.8 B12 DEFICIENCY: ICD-10-CM

## 2024-03-22 DIAGNOSIS — Z13.220 SCREENING CHOLESTEROL LEVEL: ICD-10-CM

## 2024-03-22 DIAGNOSIS — Z13.29 THYROID DISORDER SCREENING: ICD-10-CM

## 2024-03-22 DIAGNOSIS — G89.29 CHRONIC LEFT HIP PAIN: Primary | ICD-10-CM

## 2024-03-22 DIAGNOSIS — M54.16 LUMBAR RADICULOPATHY, CHRONIC: ICD-10-CM

## 2024-03-22 DIAGNOSIS — Z13.1 DIABETES MELLITUS SCREENING: ICD-10-CM

## 2024-03-22 DIAGNOSIS — N39.0 URINARY TRACT INFECTION WITHOUT HEMATURIA, SITE UNSPECIFIED: ICD-10-CM

## 2024-03-22 DIAGNOSIS — Z13.0 SCREENING, IRON DEFICIENCY ANEMIA: ICD-10-CM

## 2024-03-22 PROCEDURE — 99214 OFFICE O/P EST MOD 30 MIN: CPT | Performed by: STUDENT IN AN ORGANIZED HEALTH CARE EDUCATION/TRAINING PROGRAM

## 2024-03-22 RX ORDER — PREGABALIN 100 MG/1
100 CAPSULE ORAL
COMMUNITY
Start: 2023-12-21 | End: 2024-03-22 | Stop reason: SDUPTHER

## 2024-03-22 RX ORDER — PREGABALIN 100 MG/1
100 CAPSULE ORAL
Qty: 90 CAPSULE | Refills: 0 | Status: SHIPPED | OUTPATIENT
Start: 2024-03-22

## 2024-03-22 NOTE — PROGRESS NOTES
Clinic Visit Note  Olesya Negrete 43 y.o. female   MRN: [id number]    Assessment and Plan      Diagnoses and all orders for this visit:    Chronic left hip pain  Recommend follow-up with orthopedics, planned visit 3/28/2024, will most likely need steroid injection therapy, previous MRI, will obtain out of network imaging for evaluation    Urinary tract infection without hematuria, site unspecified  Symptoms improved, urgent care antibiotic    Thyroid disorder screening  -     TSH, 3rd generation with Free T4 reflex; Future    Diabetes mellitus screening  -     Hemoglobin A1C; Future    Screening, iron deficiency anemia  -     CBC and differential; Future    B12 deficiency  -     Vitamin B12; Future    Vitamin D deficiency  -     Vitamin D 25 hydroxy; Future    Electrolyte abnormality  -     Comprehensive metabolic panel; Future    Screening cholesterol level  -     Lipid panel; Future    Lumbar radiculopathy, chronic  -     pregabalin (LYRICA) 100 mg capsule; Take 1 capsule (100 mg total) by mouth daily at bedtime    Follow-up after orthopedics recommended, annual physical with blood work.    My impressions and treatment recommendations were discussed in detail with the patient who verbalized understanding and had no further questions.  Discharge instructions were provided.    Subjective     Chief Complaint: F/U    History of Present Illness:    Patient is a pleasant 43-year-old female presenting secondary to lumbar radiculopathy symptoms currently on Lyrica, chronic left hip pain.  Patient is following with out of network orthopedics recommending injection therapy.    The following portions of the patient's history were reviewed and updated as appropriate: allergies, current medications, past family history, past medical history, past social history, past surgical history and problem list.    REVIEW OF SYSTEMS:  A complete 12-point review of systems is negative other than that noted in the HPI.    Review of  Systems   Constitutional:  Negative for chills and fever.   HENT:  Negative for ear pain and sore throat.    Eyes:  Negative for pain and visual disturbance.   Respiratory:  Negative for cough and shortness of breath.    Cardiovascular:  Negative for chest pain and palpitations.   Gastrointestinal:  Negative for abdominal pain and vomiting.   Genitourinary:  Negative for dysuria and hematuria.   Musculoskeletal:  Positive for arthralgias and back pain. Negative for neck pain and neck stiffness.   Skin:  Negative for color change and rash.   Neurological:  Negative for seizures and syncope.   All other systems reviewed and are negative.        Current Outpatient Medications:   •  pregabalin (LYRICA) 100 mg capsule, Take 1 capsule (100 mg total) by mouth daily at bedtime, Disp: 90 capsule, Rfl: 0  History reviewed. No pertinent past medical history.  History reviewed. No pertinent surgical history.  Social History     Socioeconomic History   • Marital status: Unknown     Spouse name: Not on file   • Number of children: Not on file   • Years of education: Not on file   • Highest education level: Not on file   Occupational History   • Not on file   Tobacco Use   • Smoking status: Never   • Smokeless tobacco: Never   Vaping Use   • Vaping status: Never Used   Substance and Sexual Activity   • Alcohol use: Never   • Drug use: Never   • Sexual activity: Not on file   Other Topics Concern   • Not on file   Social History Narrative   • Not on file     Social Determinants of Health     Financial Resource Strain: Not on file   Food Insecurity: Not on file   Transportation Needs: Not on file   Physical Activity: Not on file   Stress: Not on file   Social Connections: Not on file   Intimate Partner Violence: Not on file   Housing Stability: Not on file     Family History   Problem Relation Age of Onset   • Stroke Mother    • Heart attack Mother    • Cancer Father    • Diabetes Maternal Grandmother      No Known  Allergies    Objective     Vitals:    03/22/24 1133   BP: 140/80   BP Location: Left arm   Patient Position: Sitting   Cuff Size: Adult   Pulse: 88   Resp: 14   Temp: 98 °F (36.7 °C)   TempSrc: Temporal   Weight: 75.8 kg (167 lb)   Height: 5' (1.524 m)       Physical Exam:     GENERAL: NAD, pleasant   HEENT:  NC/AT, PERRL, EOMI, no scleral icterus  CARDIAC:  RRR, +S1/S2, no S3/S4 appreciated, no m/g/r  PULMONARY:  CTA B/L, no wheezing/rales/rhonci, non-labored breathing  ABDOMEN:  Soft, NT/ND, no rebound/guarding/rigidity  Extremities:. No edema, cyanosis, or clubbing  Musculoskeletal:  Full range of motion intact in all extremities   NEUROLOGIC: Grossly intact, no focal deficits  SKIN:  No rashes or erythema noted on exposed skin  Psych: Normal affect, mood stable    ==  PLEASE NOTE:  This encounter was completed utilizing the [employer] Direct Speech Voice Recognition Software. Grammatical errors, random word insertions, pronoun errors and incomplete sentences are occasional consequences of the system due to software limitations, ambient noise and hardware issues.These may be missed by proof reading prior to affixing electronic signature. Any questions or concerns about the content, text or information contained within the body of this dictation should be directly addressed to the physician for clarification. Please do not hesitate to call me directly if you have any any questions or concerns.    Wil De Oliveira, DO  [facility]   [facility]

## 2024-04-19 ENCOUNTER — TELEPHONE (OUTPATIENT)
Age: 44
End: 2024-04-19

## 2024-04-19 DIAGNOSIS — M54.16 LUMBAR RADICULOPATHY, CHRONIC: ICD-10-CM

## 2024-04-19 NOTE — TELEPHONE ENCOUNTER
PT said the orthopedic doctor she was seeing is no longer participating with her insurance. PT requesting if Dr De Oliveira would put another referral in for her. She is requesting a phone call to advise.    Thank You

## 2024-04-22 ENCOUNTER — TELEPHONE (OUTPATIENT)
Age: 44
End: 2024-04-22

## 2024-04-22 DIAGNOSIS — M54.16 LUMBAR RADICULOPATHY, CHRONIC: ICD-10-CM

## 2024-04-22 DIAGNOSIS — M25.552 CHRONIC LEFT HIP PAIN: Primary | ICD-10-CM

## 2024-04-22 DIAGNOSIS — G89.29 CHRONIC LEFT HIP PAIN: Primary | ICD-10-CM

## 2024-04-22 RX ORDER — PREGABALIN 100 MG/1
100 CAPSULE ORAL
Qty: 90 CAPSULE | Refills: 0 | Status: SHIPPED | OUTPATIENT
Start: 2024-04-22

## 2024-04-22 RX ORDER — PREGABALIN 100 MG/1
100 CAPSULE ORAL
Qty: 90 CAPSULE | Refills: 0 | Status: CANCELLED | OUTPATIENT
Start: 2024-04-22

## 2024-04-22 NOTE — TELEPHONE ENCOUNTER
Patient advised. She already had appointment scheduled 5/20 which I changed to PE. Patient aware.      Patient requested refill pregablin sent to [facility].

## 2024-04-22 NOTE — TELEPHONE ENCOUNTER
Caller: Patient    Doctor: Linus    Reason for call:     Patient looking to schedule appt , we do not take her insurance.    Call back#: n/a

## 2024-06-14 DIAGNOSIS — M54.16 LUMBAR RADICULOPATHY, CHRONIC: ICD-10-CM

## 2024-06-14 NOTE — TELEPHONE ENCOUNTER
Patient requests a refill of pregabalin (LYRICA) 100 mg capsule be sent to [id number] - [place], [place] - [facility] [address].    Thank you.

## 2024-06-17 RX ORDER — PREGABALIN 100 MG/1
100 CAPSULE ORAL
Qty: 90 CAPSULE | Refills: 0 | Status: SHIPPED | OUTPATIENT
Start: 2024-06-17

## 2025-02-04 ENCOUNTER — OFFICE VISIT (OUTPATIENT)
Dept: FAMILY MEDICINE CLINIC | Facility: CLINIC | Age: 45
End: 2025-02-04
Payer: COMMERCIAL

## 2025-02-04 VITALS
HEIGHT: 60 IN | WEIGHT: 147.8 LBS | SYSTOLIC BLOOD PRESSURE: 138 MMHG | RESPIRATION RATE: 14 BRPM | TEMPERATURE: 98.3 F | OXYGEN SATURATION: 100 % | HEART RATE: 97 BPM | DIASTOLIC BLOOD PRESSURE: 88 MMHG | BODY MASS INDEX: 29.02 KG/M2

## 2025-02-04 DIAGNOSIS — Z13.29 THYROID DISORDER SCREENING: ICD-10-CM

## 2025-02-04 DIAGNOSIS — Z13.0 SCREENING, IRON DEFICIENCY ANEMIA: ICD-10-CM

## 2025-02-04 DIAGNOSIS — E53.8 B12 DEFICIENCY: ICD-10-CM

## 2025-02-04 DIAGNOSIS — G89.29 CHRONIC LEFT HIP PAIN: Primary | ICD-10-CM

## 2025-02-04 DIAGNOSIS — Z12.31 ENCOUNTER FOR SCREENING MAMMOGRAM FOR MALIGNANT NEOPLASM OF BREAST: ICD-10-CM

## 2025-02-04 DIAGNOSIS — Z13.1 DIABETES MELLITUS SCREENING: ICD-10-CM

## 2025-02-04 DIAGNOSIS — E55.9 VITAMIN D DEFICIENCY: ICD-10-CM

## 2025-02-04 DIAGNOSIS — M50.00 HERNIATED NUCLEUS PULPOSUS WITH MYELOPATHY, CERVICAL: ICD-10-CM

## 2025-02-04 DIAGNOSIS — M25.552 CHRONIC LEFT HIP PAIN: Primary | ICD-10-CM

## 2025-02-04 DIAGNOSIS — E87.8 ELECTROLYTE ABNORMALITY: ICD-10-CM

## 2025-02-04 DIAGNOSIS — Z13.220 SCREENING CHOLESTEROL LEVEL: ICD-10-CM

## 2025-02-04 PROCEDURE — 99214 OFFICE O/P EST MOD 30 MIN: CPT | Performed by: STUDENT IN AN ORGANIZED HEALTH CARE EDUCATION/TRAINING PROGRAM

## 2025-02-04 NOTE — PROGRESS NOTES
Name: Olesya Negrete      : 1980      MRN: [id number]  Encounter Provider: Wil De Oliveira DO  Encounter Date: 2025   Encounter department: [facility]  :  Assessment & Plan  Chronic left hip pain  Acute on chronic left hip pain with left lower extremity pain, recommend orthopedic evaluation for x-ray imaging, appropriate surgical workup, appreciate recommendations.  Patient will also follow-up in the office after orthopedics with yearly blood work.  Orders:  •  Ambulatory Referral to Orthopedic Surgery; Future    Encounter for screening mammogram for malignant neoplasm of breast    Orders:  •  Mammo screening bilateral w 3d and cad; Future    Herniated nucleus pulposus with myelopathy, cervical  History noted, asymptomatic today in office       Thyroid disorder screening    Orders:  •  T4, free; Future  •  TSH, 3rd generation; Future    Electrolyte abnormality    Orders:  •  Comprehensive metabolic panel; Future    Screening, iron deficiency anemia    Orders:  •  CBC and differential; Future    B12 deficiency    Orders:  •  Vitamin B12; Future    Vitamin D deficiency    Orders:  •  Vitamin D 25 hydroxy; Future    Diabetes mellitus screening    Orders:  •  Lipid panel; Future    Screening cholesterol level    Orders:  •  Hemoglobin A1C; Future          Depression Screening and Follow-up Plan: Patient was screened for depression during today's encounter. They screened negative with a PHQ-2 score of 0.      History of Present Illness   Follow-up visit, acute on chronic left hip pain.      Review of Systems   Constitutional:  Negative for chills and fever.   HENT:  Negative for ear pain and sore throat.    Eyes:  Negative for pain and visual disturbance.   Respiratory:  Negative for cough and shortness of breath.    Cardiovascular:  Negative for chest pain and palpitations.   Gastrointestinal:  Negative for abdominal pain, constipation, diarrhea, nausea and vomiting.    Genitourinary:  Negative for dysuria and hematuria.   Musculoskeletal:  Positive for back pain. Negative for arthralgias, neck pain and neck stiffness.   Skin:  Negative for color change and rash.   Neurological:  Negative for dizziness, seizures, syncope and headaches.   Psychiatric/Behavioral:  Negative for agitation, behavioral problems and confusion.    All other systems reviewed and are negative.      Objective   /88 (BP Location: Left arm, Patient Position: Sitting, Cuff Size: Standard)   Pulse 97   Temp 98.3 °F (36.8 °C) (Temporal)   Resp 14   Ht 5' (1.524 m)   Wt 67 kg (147 lb 12.8 oz)   LMP 02/04/2025   SpO2 100%   BMI 28.87 kg/m²      Physical Exam  Vitals and nursing note reviewed.   Constitutional:       General: She is not in acute distress.     Appearance: She is well-developed.   HENT:      Head: Normocephalic and atraumatic.   Eyes:      General: No scleral icterus.     Conjunctiva/sclera: Conjunctivae normal.   Cardiovascular:      Rate and Rhythm: Normal rate and regular rhythm.      Pulses: Normal pulses.      Heart sounds: No murmur heard.  Pulmonary:      Effort: Pulmonary effort is normal. No respiratory distress.      Breath sounds: Normal breath sounds.   Abdominal:      General: Bowel sounds are normal. There is no distension.      Palpations: Abdomen is soft.      Tenderness: There is no abdominal tenderness.   Musculoskeletal:         General: Tenderness and signs of injury present. No swelling. Normal range of motion.      Cervical back: Neck supple.   Skin:     General: Skin is warm and dry.      Capillary Refill: Capillary refill takes less than 2 seconds.      Coloration: Skin is not jaundiced.   Neurological:      General: No focal deficit present.      Mental Status: She is alert and oriented to person, place, and time. Mental status is at baseline.   Psychiatric:         Mood and Affect: Mood normal.         Behavior: Behavior normal.

## 2025-02-09 LAB
25(OH)D3 SERPL-MCNC: 25 NG/ML (ref 30–100)
ALBUMIN SERPL-MCNC: 4 G/DL (ref 3.6–5.1)
ALBUMIN/GLOB SERPL: 1.6 (CALC) (ref 1–2.5)
ALP SERPL-CCNC: 66 U/L (ref 31–125)
ALT SERPL-CCNC: 11 U/L (ref 6–29)
AST SERPL-CCNC: 11 U/L (ref 10–30)
BASOPHILS # BLD AUTO: 52 CELLS/UL (ref 0–200)
BASOPHILS NFR BLD AUTO: 1.3 %
BILIRUB SERPL-MCNC: 0.4 MG/DL (ref 0.2–1.2)
BUN SERPL-MCNC: 12 MG/DL (ref 7–25)
BUN/CREAT SERPL: NORMAL (CALC) (ref 6–22)
CALCIUM SERPL-MCNC: 8.7 MG/DL (ref 8.6–10.2)
CHLORIDE SERPL-SCNC: 109 MMOL/L (ref 98–110)
CHOLEST SERPL-MCNC: 123 MG/DL
CHOLEST/HDLC SERPL: 2.9 (CALC)
CO2 SERPL-SCNC: 25 MMOL/L (ref 20–32)
CREAT SERPL-MCNC: 0.51 MG/DL (ref 0.5–0.99)
EOSINOPHIL # BLD AUTO: 140 CELLS/UL (ref 15–500)
EOSINOPHIL NFR BLD AUTO: 3.5 %
ERYTHROCYTE [DISTWIDTH] IN BLOOD BY AUTOMATED COUNT: 15.4 % (ref 11–15)
GFR/BSA.PRED SERPLBLD CYS-BASED-ARV: 118 ML/MIN/1.73M2
GLOBULIN SER CALC-MCNC: 2.5 G/DL (CALC) (ref 1.9–3.7)
GLUCOSE SERPL-MCNC: 95 MG/DL (ref 65–99)
HBA1C MFR BLD: 5.3 % OF TOTAL HGB
HCT VFR BLD AUTO: 31 % (ref 35–45)
HDLC SERPL-MCNC: 43 MG/DL
HGB BLD-MCNC: 9.2 G/DL (ref 11.7–15.5)
LDLC SERPL CALC-MCNC: 67 MG/DL (CALC)
LYMPHOCYTES # BLD AUTO: 1680 CELLS/UL (ref 850–3900)
LYMPHOCYTES NFR BLD AUTO: 42 %
MCH RBC QN AUTO: 22.4 PG (ref 27–33)
MCHC RBC AUTO-ENTMCNC: 29.7 G/DL (ref 32–36)
MCV RBC AUTO: 75.4 FL (ref 80–100)
MONOCYTES # BLD AUTO: 360 CELLS/UL (ref 200–950)
MONOCYTES NFR BLD AUTO: 9 %
NEUTROPHILS # BLD AUTO: 1768 CELLS/UL (ref 1500–7800)
NEUTROPHILS NFR BLD AUTO: 44.2 %
NONHDLC SERPL-MCNC: 80 MG/DL (CALC)
PLATELET # BLD AUTO: 398 THOUSAND/UL (ref 140–400)
PMV BLD REES-ECKER: 10.3 FL (ref 7.5–12.5)
POTASSIUM SERPL-SCNC: 4.6 MMOL/L (ref 3.5–5.3)
PROT SERPL-MCNC: 6.5 G/DL (ref 6.1–8.1)
RBC # BLD AUTO: 4.11 MILLION/UL (ref 3.8–5.1)
SODIUM SERPL-SCNC: 139 MMOL/L (ref 135–146)
T4 FREE SERPL-MCNC: 1.1 NG/DL (ref 0.8–1.8)
TRIGL SERPL-MCNC: 53 MG/DL
TSH SERPL-ACNC: 1.73 MIU/L
VIT B12 SERPL-MCNC: 513 PG/ML (ref 200–1100)
WBC # BLD AUTO: 4 THOUSAND/UL (ref 3.8–10.8)

## 2025-02-10 ENCOUNTER — RESULTS FOLLOW-UP (OUTPATIENT)
Dept: OTHER | Facility: HOSPITAL | Age: 45
End: 2025-02-10

## 2025-02-10 NOTE — TELEPHONE ENCOUNTER
----- Message from Wil De Oliveira DO sent at 2/10/2025  6:55 AM EST -----  Please schedule patient for later this week or early next week to discuss hemoglobin levels.

## 2025-02-10 NOTE — TELEPHONE ENCOUNTER
Called patient and scheduled appointment tomorrow with Dr De Oliveira.   MICU PROGRESS NOTE      SUBJECTIVE:  - Urine output 1200mL overnight  - Feels well this morning, no more chest pressure, though does complain of mild SOB (while breathing on RA)   - Still has some Lt scapular pain  - Denies F/C/S, N/V, Abd pain, dysuria  - BP 150s/90s this AM  - Continuing on Nitro gtt and Insulin gtt    Prior to Admission medications    Medication Sig Start Date End Date Taking? Authorizing Provider   famotidine (PEPCID) 20 MG tablet Take 20 mg by mouth daily. 1/18/21   Outside Provider   pioglitazone (ACTOS) 15 MG tablet Take 15 mg by mouth daily. 1/18/21   Outside Provider   rosuvastatin (CRESTOR) 40 MG tablet Take 40 mg by mouth daily. 1/21/21   Outside Provider   clopidogrel (PLAVIX) 75 MG tablet Take 1 tablet by mouth daily. 1/18/21   Ney Garvey DO   apixaBAN (ELIQUIS) 2.5 MG Tab Take 1 tablet by mouth every 12 hours. 1/13/21   Jacki Davis MD   carvedilol (COREG) 12.5 MG tablet Take 1 tablet by mouth every 12 hours. 1/13/21   Jacki Davis MD   atorvastatin (LIPITOR) 40 MG tablet Take 1 tablet by mouth nightly. 1/12/21   Jacki Davis MD   isosorbide mononitrate (IMDUR) 30 MG 24 hr tablet Take 1 tablet by mouth daily. Do not start before January 13, 2021. 1/13/21   Jacki Davis MD   ticagrelor (BRILINTA) 90 MG Tab Take 1 tablet by mouth every 12 hours. 1/12/21   Jacki Davis MD   Alphagan P 0.15 % ophthalmic solution 1 DROP INTO EACH EYE AT BEDTIME 1/8/21   Outside Provider   Jardiance 25 MG tablet Take 25 mg by mouth daily. 12/23/20   Outside Provider   Vitamin D, Ergocalciferol, 1.25 mg (50,000 units) capsule TAKE 1 CAPSULE BY MOUTH ONCE WEEKLY AS DIRECTED 12/23/20   Outside Provider   FLUoxetine (PROzac) 20 MG capsule Take 20 mg by mouth daily. 12/23/20   Outside Provider   Sure Comfort Lancets 30G Misc USE AS DIRECTED 1 TIME A DAY 11/4/20   Outside Provider   Travatan Z 0.004 % ophthalmic solution 1 DROP INTO EACH EYE EVERY DAY AT BEDTIME 1/8/21   Outside  Provider   Glucose Blood (GLUCONAVII BLOOD GLUCOSE TEST VI) USE AS DIRECTED 1 TIME A DAY 11/4/20   Outside Provider      Current Facility-Administered Medications   Medication Dose Route Frequency Provider Last Rate Last Admin   • sodium chloride 0.9 % flush bag 25 mL  25 mL Intravenous PRN Kayla Lau DO       • sodium chloride (PF) 0.9 % injection 2 mL  2 mL Intracatheter 2 times per day Kayla Lau DO       • sodium chloride 0.9% infusion   Intravenous Continuous PRN Kayla Lau DO       • sodium chloride 0.9% infusion   Intravenous Continuous PRN Kayla Lau DO       • sodium chloride (NORMAL SALINE) 0.9 % bolus 500 mL  500 mL Intravenous PRN Kayla Lau DO       • nitroGLYcerin 50 mg in dextrose 5% 250 mL infusion  0-200 mcg/min Intravenous Continuous Kayla Lau DO 30 mL/hr at 04/19/21 0406 100 mcg/min at 04/19/21 0406   • dextrose 50 % injection 25 g  25 g Intravenous PRN Kayla Lau DO       • dextrose 50 % injection 12.5 g  12.5 g Intravenous PRN Kayla Lau DO       • glucagon (GLUCAGEN) injection 1 mg  1 mg Intramuscular PRN Kayla Lau DO       • dextrose (GLUTOSE) 40 % gel 15 g  15 g Oral PRN Kayla Lau DO       • dextrose (GLUTOSE) 40 % gel 30 g  30 g Oral PRN Kayla Lau DO       • brimonidine (ALPHAGAN) 0.2 % ophthalmic solution 1 drop  1 drop Both Eyes [facility] Kayla Lau DO       • [START ON 4/20/2021] atorvastatin (LIPITOR) tablet 40 mg  40 mg Oral Nightly Kayla Lau DO       • latanoprost (XALATAN) 0.005 % ophthalmic solution 1 drop  1 drop Both Eyes Nightly Kayla Lau DO       • carvedilol (COREG) tablet 6.25 mg  6.25 mg Oral 2 times per day Kayla Lau DO       • clopidogrel (PLAVIX) tablet 75 mg  75 mg Oral Daily Kayla Lau DO       • famotidine (PEPCID) tablet 20 mg  20 mg Oral Daily Kayla Lau DO       • furosemide (LASIX) tablet 40 mg  40 mg Oral Daily Kayla Lau DO       • isosorbide mononitrate  (IMDUR) ER tablet 30 mg  30 mg Oral Daily Kayla Lau DO       • insulin regular (human) (HumuLIN R) 100 units in sodium chloride 0.9% 100 mL infusion  0-45 Units/hr Intravenous Continuous Kayla Lau DO 1 mL/hr at 04/19/21 0707 1 Units/hr at 04/19/21 0707   • dextrose 5 % / sodium chloride 0.45% infusion   Intravenous Continuous Kayla Lau DO            OBJECTIVE:    Vital Last Value 24 Hour Range   Temperature 97 °F (36.1 °C) (04/18/21 2242) Temp  Min: 97 °F (36.1 °C)  Max: 97 °F (36.1 °C)   Pulse 72 (04/19/21 0800) Pulse  Min: 65  Max: 90   Respiratory 14 (04/19/21 0800) Resp  Min: 13  Max: 31   Non-Invasive  Blood Pressure (!) 168/66 (04/19/21 0800) BP  Min: 147/70  Max: 244/95   Pulse Oximetry 97 % (04/19/21 0800) SpO2  Min: 94 %  Max: 100 %   Arterial   Blood Pressure   No data recorded        Intake/Output Summary (Last 24 hours) at 4/19/2021 0805  Last data filed at 4/19/2021 0600  Gross per 24 hour   Intake --   Output 1250 ml   Net -1250 ml          Physical Exam  Vitals and nursing note reviewed.   Constitutional:       General: She is not in acute distress.     Appearance: Normal appearance. She is not ill-appearing, toxic-appearing or diaphoretic.   HENT:      Head: Normocephalic and atraumatic.      Nose: Nose normal.   Eyes:      General: No scleral icterus.     Extraocular Movements: Extraocular movements intact.      Pupils: Pupils are equal, round, and reactive to light.   Cardiovascular:      Rate and Rhythm: Normal rate and regular rhythm.      Pulses: Normal pulses.      Heart sounds: Normal heart sounds. No friction rub.   Pulmonary:      Effort: Pulmonary effort is normal. No respiratory distress.      Breath sounds: Normal breath sounds. No stridor. No wheezing, rhonchi or rales.   Chest:      Chest wall: No tenderness.   Abdominal:      General: There is no distension.      Palpations: Abdomen is soft.   Musculoskeletal:      Right lower leg: No edema.      Left lower leg: No  edema.   Lymphadenopathy:      Cervical: No cervical adenopathy.   Neurological:      General: No focal deficit present.      Mental Status: She is alert and oriented to person, place, and time. Mental status is at baseline.   Psychiatric:         Mood and Affect: Mood normal.         Behavior: Behavior normal.         Thought Content: Thought content normal.         Judgment: Judgment normal.      Labs:  Recent Labs   Lab 04/19/21  0544 04/18/21 2239 01/13/21  0944 01/12/21  0513   WBC 4.1* 4.2 4.9 4.9   RBC 3.08* 3.92* 3.95* 3.91*   HGB 8.2* 10.4* 10.6* 10.5*   HCT 26.9* 34.4* 34.2* 32.8*   MCV 87.3 87.8 86.6 83.9   MCH 26.6 26.5 26.8 26.9   MCHC 30.5* 30.2* 31.0* 32.0   RDW-CV 15.7* 15.7* 15.1* 15.4*    213 224 215   Lymphocytes, Percent 15 19  --  21       Recent Labs   Lab 04/19/21  0544 04/18/21 2239 01/26/21  1109 01/13/21  0944   Sodium 139 138 140 138   Chloride 110* 108* 112* 108*   BUN 60* 64* 54* 44*   BUN/ Creatinine Ratio 22 24 16 15   Potassium 5.0 5.4* 4.4 3.6   Glucose 224* 221* 289* 196*   Creatinine 2.73* 2.64* 3.37* 2.93*   Calcium 8.6 8.5 8.9 8.9       Recent Labs   Lab 04/18/21 2239   PT 12.4*   PTT 32*   INR 1.2       Imaging/Micro/Path:      Microbiology Results     None          * Cannot find OR log *    ASSESSMENT AND PLAN:  82 year old female [place]ian-speaking, with past medical history significant for non-insulin-dependent diabetes mellitus, CKD, hypertension, history of inferior STEMI status post PCI with AGAPITO to right posterior artery on 1/10 who presented for left-sided chest pain with radiation to the back since 5 PM 4/18/2021. EMS was called and brought to ED for evaluation.      Cardiology  #Hypertensive emergency  #Troponemia, likely in the setting of above   #History of inferior STEMI s/p PCI with AGAPITO to right posterior artery   #HLD  -Trend troponin  -Continue home Eliquis and Plavix   -Nitro gtt, goal SBP < 160   -Continue home carvedilol, Imdur, and amlodipine in  attempt to wean Nitro gtt   -TTE  -Continue home Lipitor   -Cards consulted    Renal  #CKD Stage 4, b/l Cr 2.7   - s/p IV Lasix 20mg x1 in ED  - PO Lasix 40mg daily  - Strict &O's    Endo  #Diabetes Mellitus   - DC Insulin gtt, start po intake   - On 70-30 insulin (16 U in AM, 6 U in PM) at home   -when converted to Lantus and Lispro - Lantus 15 U, Lispro 2 U with meals   -Will give Lantus 10 U for now with the Lispro   - mode-dose SSI     Heme  #Normocytic anemia, likely in setting of CKD  - Hgb 8.2  - No overt signs of bleeding noted   - Fe 160, Ferritin 79 (4/14)  - s/p Ferrlecit inj on 4/13/2021     FEN  No IVFs, monitor and replete lytes PRN, NPO except for meds now in case of intervention.     DVT Prophylaxis  Eliquis  SCDs     GI prophylaxis  famotidine    Code Status  FULL Code    Smoking Cessation  Counseling given: Not Answered    Primary Care Physician  Lexy Wharton MD     Please see attending addendum for final recommendations.    Dispo: Transfer to floors when Nitro gtt is turned off      Edward Anderson, DO   Internal Medicine, PGY-1  House Phone:     4/19/2021 8:05 AM

## 2025-02-11 ENCOUNTER — OFFICE VISIT (OUTPATIENT)
Dept: FAMILY MEDICINE CLINIC | Facility: CLINIC | Age: 45
End: 2025-02-11
Payer: COMMERCIAL

## 2025-02-11 ENCOUNTER — APPOINTMENT (OUTPATIENT)
Dept: RADIOLOGY | Facility: CLINIC | Age: 45
End: 2025-02-11
Payer: COMMERCIAL

## 2025-02-11 ENCOUNTER — OFFICE VISIT (OUTPATIENT)
Dept: OBGYN CLINIC | Facility: CLINIC | Age: 45
End: 2025-02-11
Payer: COMMERCIAL

## 2025-02-11 VITALS
HEART RATE: 86 BPM | SYSTOLIC BLOOD PRESSURE: 102 MMHG | TEMPERATURE: 98.7 F | DIASTOLIC BLOOD PRESSURE: 80 MMHG | HEIGHT: 60 IN | WEIGHT: 148.3 LBS | RESPIRATION RATE: 16 BRPM | BODY MASS INDEX: 29.12 KG/M2

## 2025-02-11 VITALS — BODY MASS INDEX: 29.06 KG/M2 | HEIGHT: 60 IN | WEIGHT: 148 LBS

## 2025-02-11 DIAGNOSIS — R10.13 EPIGASTRIC PAIN: ICD-10-CM

## 2025-02-11 DIAGNOSIS — D50.9 IRON DEFICIENCY ANEMIA, UNSPECIFIED IRON DEFICIENCY ANEMIA TYPE: ICD-10-CM

## 2025-02-11 DIAGNOSIS — M25.552 CHRONIC LEFT HIP PAIN: ICD-10-CM

## 2025-02-11 DIAGNOSIS — M50.00 HERNIATED NUCLEUS PULPOSUS WITH MYELOPATHY, CERVICAL: ICD-10-CM

## 2025-02-11 DIAGNOSIS — Z12.4 CERVICAL CANCER SCREENING: ICD-10-CM

## 2025-02-11 DIAGNOSIS — G89.29 CHRONIC LEFT HIP PAIN: ICD-10-CM

## 2025-02-11 DIAGNOSIS — M16.12 PRIMARY OSTEOARTHRITIS OF ONE HIP, LEFT: ICD-10-CM

## 2025-02-11 DIAGNOSIS — Z00.00 ANNUAL PHYSICAL EXAM: Primary | ICD-10-CM

## 2025-02-11 PROCEDURE — 99214 OFFICE O/P EST MOD 30 MIN: CPT | Performed by: STUDENT IN AN ORGANIZED HEALTH CARE EDUCATION/TRAINING PROGRAM

## 2025-02-11 PROCEDURE — 99203 OFFICE O/P NEW LOW 30 MIN: CPT | Performed by: ORTHOPAEDIC SURGERY

## 2025-02-11 PROCEDURE — 73502 X-RAY EXAM HIP UNI 2-3 VIEWS: CPT

## 2025-02-11 PROCEDURE — 99396 PREV VISIT EST AGE 40-64: CPT | Performed by: STUDENT IN AN ORGANIZED HEALTH CARE EDUCATION/TRAINING PROGRAM

## 2025-02-11 RX ORDER — TRAMADOL HYDROCHLORIDE 50 MG/1
50 TABLET ORAL EVERY 12 HOURS PRN
Qty: 30 TABLET | Refills: 0 | Status: SHIPPED | OUTPATIENT
Start: 2025-02-11

## 2025-02-11 RX ORDER — FERROUS SULFATE 324(65)MG
324 TABLET, DELAYED RELEASE (ENTERIC COATED) ORAL EVERY OTHER DAY
Qty: 45 TABLET | Refills: 0 | Status: SHIPPED | OUTPATIENT
Start: 2025-02-11

## 2025-02-11 RX ORDER — PANTOPRAZOLE SODIUM 20 MG/1
20 TABLET, DELAYED RELEASE ORAL
Qty: 30 TABLET | Refills: 5 | Status: SHIPPED | OUTPATIENT
Start: 2025-02-11 | End: 2025-08-10

## 2025-02-11 NOTE — PROGRESS NOTES
Adult Annual Physical  Name: Olesya Negrete      : 1980      MRN: [id number]  Encounter Provider: Wil De Oliveira DO  Encounter Date: 2025   Encounter department: [facility]    Assessment & Plan  Annual physical exam  Annual physical today in office, follow-up cervical cancer screening with OB/GYN, blood work reviewed       Chronic left hip pain  We discussed holding off on NSAID therapy, can use OTC Tylenol, tramadol up to twice daily as needed, appreciate orthopedic evaluation  Orders:  •  traMADol (Ultram) 50 mg tablet; Take 1 tablet (50 mg total) by mouth every 12 (twelve) hours as needed for moderate pain    Herniated nucleus pulposus with myelopathy, cervical  History noted, asymptomatic       Iron deficiency anemia, unspecified iron deficiency anemia type  Iron deficiency anemia in the setting of heavy menstrual cycle, increased NSAID use.  Recommend iron supplementation every other day, referral to gastroenterology for endoscopic evaluation.  Orders:  •  ferrous sulfate 324 (65 Fe) mg; Take 1 tablet (324 mg total) by mouth every other day  •  Ambulatory Referral to Gastroenterology; Future    Epigastric pain  Appreciate gastroenterology recommendations for endoscopic evaluation in the setting of iron deficiency anemia, NSAID use.  Recommend Protonix, PPI therapy.  Orders:  •  Ambulatory Referral to Gastroenterology; Future  •  pantoprazole (PROTONIX) 20 mg tablet; Take 1 tablet (20 mg total) by mouth daily before breakfast    Cervical cancer screening    Orders:  •  Ambulatory Referral to Obstetrics / Gynecology; Future      Immunizations and preventive care screenings were discussed with patient today. Appropriate education was printed on patient's after visit summary.    Counseling:  Alcohol/drug use: discussed moderation in alcohol intake, the recommendations for healthy alcohol use, and avoidance of illicit drug use.  Dental Health: discussed importance of  regular tooth brushing, flossing, and dental visits.  Injury prevention: discussed safety/seat belts, safety helmets, smoke detectors, carbon monoxide detectors, and smoking near bedding or upholstery.  Sexual health: discussed sexually transmitted diseases, partner selection, use of condoms, avoidance of unintended pregnancy, and contraceptive alternatives.  Exercise: the importance of regular exercise/physical activity was discussed. Recommend exercise 3-5 times per week for at least 30 minutes.          History of Present Illness     Adult Annual Physical:  Patient presents for annual physical.     Diet and Physical Activity:  - Diet/Nutrition: well balanced diet.  - Exercise: 3-4 times a week on average and 1-2 times a week on average.    General Health:  - Sleep: sleeps well.  - Hearing: normal hearing bilateral ears.  - Vision: no vision problems.  - Dental: regular dental visits.    /GYN Health:  - Follows with GYN: yes.   - History of STDs: no    Review of Systems   Constitutional:  Negative for chills and fever.   HENT:  Negative for ear pain and sore throat.    Eyes:  Negative for pain and visual disturbance.   Respiratory:  Negative for cough and shortness of breath.    Cardiovascular:  Negative for chest pain and palpitations.   Gastrointestinal:  Negative for abdominal pain, constipation, diarrhea, nausea and vomiting.   Genitourinary:  Negative for dysuria and hematuria.   Musculoskeletal:  Positive for arthralgias. Negative for back pain.   Skin:  Negative for color change and rash.   Neurological:  Negative for dizziness, seizures and syncope.   Psychiatric/Behavioral:  Negative for agitation, behavioral problems and confusion.    All other systems reviewed and are negative.        Objective   /80 (BP Location: Left arm, Patient Position: Sitting, Cuff Size: Large)   Pulse 86   Temp 98.7 °F (37.1 °C) (Temporal)   Resp 16   Ht 5' (1.524 m)   Wt 67.3 kg (148 lb 4.8 oz)   LMP 02/04/2025    BMI 28.96 kg/m²     Physical Exam  Vitals and nursing note reviewed.   Constitutional:       General: She is not in acute distress.     Appearance: She is well-developed.   HENT:      Head: Normocephalic and atraumatic.   Eyes:      General: No scleral icterus.     Conjunctiva/sclera: Conjunctivae normal.   Cardiovascular:      Rate and Rhythm: Normal rate and regular rhythm.      Pulses: Normal pulses.      Heart sounds: No murmur heard.  Pulmonary:      Effort: Pulmonary effort is normal. No respiratory distress.      Breath sounds: Normal breath sounds.   Abdominal:      General: Bowel sounds are normal. There is no distension.      Palpations: Abdomen is soft.      Tenderness: There is no abdominal tenderness.   Musculoskeletal:         General: No swelling. Normal range of motion.      Cervical back: Neck supple.   Skin:     General: Skin is warm and dry.      Capillary Refill: Capillary refill takes less than 2 seconds.      Coloration: Skin is not jaundiced.   Neurological:      General: No focal deficit present.      Mental Status: She is alert and oriented to person, place, and time. Mental status is at baseline.   Psychiatric:         Mood and Affect: Mood normal.         Behavior: Behavior normal.

## 2025-02-11 NOTE — PATIENT INSTRUCTIONS
"Patient Education     Routine physical for adults   The Basics   Written by the doctors and editors at [facility]   What is a physical? -- A physical is a routine visit, or \"check-up,\" with your doctor. You might also hear it called a \"wellness visit\" or \"preventive visit.\"  During each visit, the doctor will:   Ask about your physical and mental health   Ask about your habits, behaviors, and lifestyle   Do an exam   Give you vaccines if needed   Talk to you about any medicines you take   Give advice about your health   Answer your questions  Getting regular check-ups is an important part of taking care of your health. It can help your doctor find and treat any problems you have. But it's also important for preventing health problems.  A routine physical is different from a \"sick visit.\" A sick visit is when you see a doctor because of a health concern or problem. Since physicals are scheduled ahead of time, you can think about what you want to ask the doctor.  How often should I get a physical? -- It depends on your age and health. In general, for people age 21 years and older:   If you are younger than 50 years, you might be able to get a physical every 3 years.   If you are 50 years or older, your doctor might recommend a physical every year.  If you have an ongoing health condition, like diabetes or high blood pressure, your doctor will probably want to see you more often.  What happens during a physical? -- In general, each visit will include:   Physical exam - The doctor or nurse will check your height, weight, heart rate, and blood pressure. They will also look at your eyes and ears. They will ask about how you are feeling and whether you have any symptoms that bother you.   Medicines - It's a good idea to bring a list of all the medicines you take to each doctor visit. Your doctor will talk to you about your medicines and answer any questions. Tell them if you are having any side effects that bother you. You " "should also tell them if you are having trouble paying for any of your medicines.   Habits and behaviors - This includes:   Your diet   Your exercise habits   Whether you smoke, drink alcohol, or use drugs   Whether you are sexually active   Whether you feel safe at home  Your doctor will talk to you about things you can do to improve your health and lower your risk of health problems. They will also offer help and support. For example, if you want to quit smoking, they can give you advice and might prescribe medicines. If you want to improve your diet or get more physical activity, they can help you with this, too.   Lab tests, if needed - The tests you get will depend on your age and situation. For example, your doctor might want to check your:   Cholesterol   Blood sugar   Iron level   Vaccines - The recommended vaccines will depend on your age, health, and what vaccines you already had. Vaccines are very important because they can prevent certain serious or deadly infections.   Discussion of screening - \"Screening\" means checking for diseases or other health problems before they cause symptoms. Your doctor can recommend screening based on your age, risk, and preferences. This might include tests to check for:   Cancer, such as breast, prostate, cervical, ovarian, colorectal, prostate, lung, or skin cancer   Sexually transmitted infections, such as chlamydia and gonorrhea   Mental health conditions like depression and anxiety  Your doctor will talk to you about the different types of screening tests. They can help you decide which screenings to have. They can also explain what the results might mean.   Answering questions - The physical is a good time to ask the doctor or nurse questions about your health. If needed, they can refer you to other doctors or specialists, too.  Adults older than 65 years often need other care, too. As you get older, your doctor will talk to you about:   How to prevent falling at " home   Hearing or vision tests   Memory testing   How to take your medicines safely   Making sure that you have the help and support you need at home  All topics are updated as new evidence becomes available and our peer review process is complete.  This topic retrieved from [employer] on: May 02, 2024.  Topic 746858 Version 1.0  Release: 32.4.3 - C32.122  © 2024 UpToDate, Inc. and/or its affiliates. All rights reserved.  Consumer Information Use and Disclaimer   Disclaimer: This generalized information is a limited summary of diagnosis, treatment, and/or medication information. It is not meant to be comprehensive and should be used as a tool to help the user understand and/or assess potential diagnostic and treatment options. It does NOT include all information about conditions, treatments, medications, side effects, or risks that may apply to a specific patient. It is not intended to be medical advice or a substitute for the medical advice, diagnosis, or treatment of a health care provider based on the health care provider's examination and assessment of a patient's specific and unique circumstances. Patients must speak with a health care provider for complete information about their health, medical questions, and treatment options, including any risks or benefits regarding use of medications. This information does not endorse any treatments or medications as safe, effective, or approved for treating a specific patient. UpToDate, Inc. and its affiliates disclaim any warranty or liability relating to this information or the use thereof.The use of this information is governed by the Terms of Use, available at https://www.woltersMyTimeuwer.com/en/know/clinical-effectiveness-terms. 2024© UpToDate, Inc. and its affiliates and/or licensors. All rights reserved.  Copyright   © 2024 UpToDate, Inc. and/or its affiliates. All rights reserved.

## 2025-02-11 NOTE — LETTER
February 12, 2025     Wil De Oliveira DO  [address] [facility] [address]    Patient: Olesya Negrete   YOB: 1980   Date of Visit: 2/11/2025       Dear Dr. De Oliveira:    Thank you for referring Olesya Negrete to me for evaluation. Below are my notes for this consultation.    If you have questions, please do not hesitate to call me. I look forward to following your patient along with you.         Sincerely,        Lc Jones DO        CC: No Recipients    Lc Jones DO  2/12/2025  4:20 PM  Sign when Signing Visit  Assessment/Plan:  1. Primary osteoarthritis of one hip, left  Ambulatory Referral to Orthopedic Surgery    XR hip/pelv 2-3 vws left if performed          Olesya has left-sided hip pain and arthritic changes which seems very advanced for her age.  I discussed with her that she may have an element of an underlying hip dysplasia when she was younger or prior injury that is advanced degenerative changes so quickly.  We discussed proceeding with a left hip cortisone injection at this time which she was agreeable to.  We will schedule her for this injection in the upcoming week or 2.  Ultimately she may require hip replacement at a young age but we will try to avoid this if possible.  Based on today's exam I do not think this is coming from her back.  She was agreeable to this plan will follow-up next week for an injection.    Subjective:   Olesya Negrete is a 44 y.o. female who presents to the office for evaluation for left-sided hip pain.  She was referred by her PCP office.  She has been dealing with ongoing discomfort in the left hip and pelvis region for the last several months.  She states she went to another orthopedic office and they were treating her back and was told she had a disc herniation.  She also remembers hearing that she had arthritis in her hip but no treatment was planned.  She has aching pain into the anterior aspect of the hip that bothers her  with increased activity getting in and out of a car.  She denies any traumatic injury or childhood injury to the hip.        Review of Systems   Constitutional:  Negative for chills, fever and unexpected weight change.   HENT:  Negative for hearing loss, nosebleeds and sore throat.    Eyes:  Negative for pain, redness and visual disturbance.   Respiratory:  Negative for cough, shortness of breath and wheezing.    Cardiovascular:  Negative for chest pain, palpitations and leg swelling.   Gastrointestinal:  Negative for abdominal pain, nausea and vomiting.   Endocrine: Negative for polydipsia and polyuria.   Genitourinary:  Negative for dysuria and hematuria.   Musculoskeletal:         See HPI   Skin:  Negative for rash and wound.   Neurological:  Negative for dizziness, numbness and headaches.   Psychiatric/Behavioral:  Negative for decreased concentration and suicidal ideas. The patient is not nervous/anxious.          History reviewed. No pertinent past medical history.    History reviewed. No pertinent surgical history.    Family History   Problem Relation Age of Onset   • Stroke Mother    • Heart attack Mother    • Cancer Father    • Diabetes Maternal Grandmother        Social History     Occupational History   • Not on file   Tobacco Use   • Smoking status: Never   • Smokeless tobacco: Never   Vaping Use   • Vaping status: Never Used   Substance and Sexual Activity   • Alcohol use: Never   • Drug use: Never   • Sexual activity: Not on file         Current Outpatient Medications:   •  ferrous sulfate 324 (65 Fe) mg, Take 1 tablet (324 mg total) by mouth every other day, Disp: 45 tablet, Rfl: 0  •  pantoprazole (PROTONIX) 20 mg tablet, Take 1 tablet (20 mg total) by mouth daily before breakfast, Disp: 30 tablet, Rfl: 5  •  traMADol (Ultram) 50 mg tablet, Take 1 tablet (50 mg total) by mouth every 12 (twelve) hours as needed for moderate pain, Disp: 30 tablet, Rfl: 0    No Known Allergies    Objective:  There were  no vitals filed for this visit.         Left Hip Exam     Tenderness   The patient is experiencing tenderness in the anterior.    Range of Motion   External rotation:  40 abnormal   Internal rotation: 10 abnormal     Other   Erythema: absent  Sensation: normal  Pulse: present      Back Exam     Tenderness   The patient is experiencing no tenderness.     Range of Motion   Extension:  normal   Flexion:  normal     Muscle Strength   Right Quadriceps:  5/5   Left Quadriceps:  5/5   Right Hamstrings:  5/5   Left Hamstrings:  5/5     Tests   Straight leg raise right: negative  Straight leg raise left: negative    Other   Gait: normal   Erythema: no back redness            Physical Exam  Vitals and nursing note reviewed.   Constitutional:       Appearance: Normal appearance. She is well-developed.   HENT:      Head: Normocephalic and atraumatic.      Right Ear: External ear normal.      Left Ear: External ear normal.   Eyes:      General: No scleral icterus.     Extraocular Movements: Extraocular movements intact.      Conjunctiva/sclera: Conjunctivae normal.   Cardiovascular:      Rate and Rhythm: Normal rate.   Pulmonary:      Effort: Pulmonary effort is normal. No respiratory distress.   Musculoskeletal:      Cervical back: Normal range of motion and neck supple.      Lumbar back: Negative right straight leg raise test and negative left straight leg raise test.      Comments: See Ortho exam   Skin:     General: Skin is warm and dry.   Neurological:      General: No focal deficit present.      Mental Status: She is alert and oriented to person, place, and time.   Psychiatric:         Behavior: Behavior normal.         I have personally reviewed pertinent films in PACS and my interpretation is as follows:  X-ray of the left hip demonstrates moderate to severe osteoarthritis and heterotopic bone formation.  No acute fracture visible.      This document was created using speech voice recognition software.   Grammatical  errors, random word insertions, pronoun errors, and incomplete sentences are an occasional consequence of this system due to software limitations, ambient noise, and hardware issues.   Any formal questions or concerns about content, text, or information contained within the body of this dictation should be directly addressed to the provider for clarification.

## 2025-02-11 NOTE — PROGRESS NOTES
Assessment/Plan:  1. Primary osteoarthritis of one hip, left  Ambulatory Referral to Orthopedic Surgery    XR hip/pelv 2-3 vws left if performed          Olesya has left-sided hip pain and arthritic changes which seems very advanced for her age.  I discussed with her that she may have an element of an underlying hip dysplasia when she was younger or prior injury that is advanced degenerative changes so quickly.  We discussed proceeding with a left hip cortisone injection at this time which she was agreeable to.  We will schedule her for this injection in the upcoming week or 2.  Ultimately she may require hip replacement at a young age but we will try to avoid this if possible.  Based on today's exam I do not think this is coming from her back.  She was agreeable to this plan will follow-up next week for an injection.    Subjective:   Olesya Negrete is a 44 y.o. female who presents to the office for evaluation for left-sided hip pain.  She was referred by her PCP office.  She has been dealing with ongoing discomfort in the left hip and pelvis region for the last several months.  She states she went to another orthopedic office and they were treating her back and was told she had a disc herniation.  She also remembers hearing that she had arthritis in her hip but no treatment was planned.  She has aching pain into the anterior aspect of the hip that bothers her with increased activity getting in and out of a car.  She denies any traumatic injury or childhood injury to the hip.        Review of Systems   Constitutional:  Negative for chills, fever and unexpected weight change.   HENT:  Negative for hearing loss, nosebleeds and sore throat.    Eyes:  Negative for pain, redness and visual disturbance.   Respiratory:  Negative for cough, shortness of breath and wheezing.    Cardiovascular:  Negative for chest pain, palpitations and leg swelling.   Gastrointestinal:  Negative for abdominal pain, nausea and vomiting.    Endocrine: Negative for polydipsia and polyuria.   Genitourinary:  Negative for dysuria and hematuria.   Musculoskeletal:         See HPI   Skin:  Negative for rash and wound.   Neurological:  Negative for dizziness, numbness and headaches.   Psychiatric/Behavioral:  Negative for decreased concentration and suicidal ideas. The patient is not nervous/anxious.          History reviewed. No pertinent past medical history.    History reviewed. No pertinent surgical history.    Family History   Problem Relation Age of Onset    Stroke Mother     Heart attack Mother     Cancer Father     Diabetes Maternal Grandmother        Social History     Occupational History    Not on file   Tobacco Use    Smoking status: Never    Smokeless tobacco: Never   Vaping Use    Vaping status: Never Used   Substance and Sexual Activity    Alcohol use: Never    Drug use: Never    Sexual activity: Not on file         Current Outpatient Medications:     ferrous sulfate 324 (65 Fe) mg, Take 1 tablet (324 mg total) by mouth every other day, Disp: 45 tablet, Rfl: 0    pantoprazole (PROTONIX) 20 mg tablet, Take 1 tablet (20 mg total) by mouth daily before breakfast, Disp: 30 tablet, Rfl: 5    traMADol (Ultram) 50 mg tablet, Take 1 tablet (50 mg total) by mouth every 12 (twelve) hours as needed for moderate pain, Disp: 30 tablet, Rfl: 0    No Known Allergies    Objective:  There were no vitals filed for this visit.         Left Hip Exam     Tenderness   The patient is experiencing tenderness in the anterior.    Range of Motion   External rotation:  40 abnormal   Internal rotation: 10 abnormal     Other   Erythema: absent  Sensation: normal  Pulse: present      Back Exam     Tenderness   The patient is experiencing no tenderness.     Range of Motion   Extension:  normal   Flexion:  normal     Muscle Strength   Right Quadriceps:  5/5   Left Quadriceps:  5/5   Right Hamstrings:  5/5   Left Hamstrings:  5/5     Tests   Straight leg raise right:  negative  Straight leg raise left: negative    Other   Gait: normal   Erythema: no back redness            Physical Exam  Vitals and nursing note reviewed.   Constitutional:       Appearance: Normal appearance. She is well-developed.   HENT:      Head: Normocephalic and atraumatic.      Right Ear: External ear normal.      Left Ear: External ear normal.   Eyes:      General: No scleral icterus.     Extraocular Movements: Extraocular movements intact.      Conjunctiva/sclera: Conjunctivae normal.   Cardiovascular:      Rate and Rhythm: Normal rate.   Pulmonary:      Effort: Pulmonary effort is normal. No respiratory distress.   Musculoskeletal:      Cervical back: Normal range of motion and neck supple.      Lumbar back: Negative right straight leg raise test and negative left straight leg raise test.      Comments: See Ortho exam   Skin:     General: Skin is warm and dry.   Neurological:      General: No focal deficit present.      Mental Status: She is alert and oriented to person, place, and time.   Psychiatric:         Behavior: Behavior normal.         I have personally reviewed pertinent films in PACS and my interpretation is as follows:  X-ray of the left hip demonstrates moderate to severe osteoarthritis and heterotopic bone formation.  No acute fracture visible.      This document was created using speech voice recognition software.   Grammatical errors, random word insertions, pronoun errors, and incomplete sentences are an occasional consequence of this system due to software limitations, ambient noise, and hardware issues.   Any formal questions or concerns about content, text, or information contained within the body of this dictation should be directly addressed to the provider for clarification.

## 2025-02-13 ENCOUNTER — TELEPHONE (OUTPATIENT)
Age: 45
End: 2025-02-13

## 2025-02-13 NOTE — TELEPHONE ENCOUNTER
Olesya was able to  her Pantoprazole at the pharmacy but they did not give her the tramadol or the ferrous sulfate. I confirmed receipt that pharmacy received both orders.   Patient will call pharmacy to see why she was not given the other 2 medications and will call back if she needs them sent to a different pharmacy or needs further assistance.

## 2025-02-20 ENCOUNTER — OFFICE VISIT (OUTPATIENT)
Dept: OBGYN CLINIC | Facility: CLINIC | Age: 45
End: 2025-02-20
Payer: COMMERCIAL

## 2025-02-20 VITALS — BODY MASS INDEX: 29.06 KG/M2 | HEIGHT: 60 IN | WEIGHT: 148 LBS

## 2025-02-20 DIAGNOSIS — M16.12 PRIMARY OSTEOARTHRITIS OF ONE HIP, LEFT: Primary | ICD-10-CM

## 2025-02-20 PROCEDURE — 20611 DRAIN/INJ JOINT/BURSA W/US: CPT | Performed by: ORTHOPAEDIC SURGERY

## 2025-02-20 PROCEDURE — 99213 OFFICE O/P EST LOW 20 MIN: CPT | Performed by: ORTHOPAEDIC SURGERY

## 2025-02-20 RX ORDER — LIDOCAINE HYDROCHLORIDE 10 MG/ML
3 INJECTION, SOLUTION INFILTRATION; PERINEURAL
Status: COMPLETED | OUTPATIENT
Start: 2025-02-20 | End: 2025-02-20

## 2025-02-20 RX ORDER — TRIAMCINOLONE ACETONIDE 40 MG/ML
80 INJECTION, SUSPENSION INTRA-ARTICULAR; INTRAMUSCULAR
Status: COMPLETED | OUTPATIENT
Start: 2025-02-20 | End: 2025-02-20

## 2025-02-20 RX ADMIN — TRIAMCINOLONE ACETONIDE 80 MG: 40 INJECTION, SUSPENSION INTRA-ARTICULAR; INTRAMUSCULAR at 09:30

## 2025-02-20 RX ADMIN — LIDOCAINE HYDROCHLORIDE 3 ML: 10 INJECTION, SOLUTION INFILTRATION; PERINEURAL at 09:30

## 2025-02-20 NOTE — PROGRESS NOTES
Assessment/Plan:  1. Primary osteoarthritis of one hip, left  Large joint arthrocentesis: L hip joint          Olesya tolerated ultrasound-guided hip injection in the left hip today.  She did experience relief following the injection and tolerated this procedure well.  We could repeat this injection 4 to 6 months if clinically indicated.  Follow-up as needed.      Large joint arthrocentesis: L hip joint  Universal Protocol:  Consent: Verbal consent obtained.  Risks and benefits: risks, benefits and alternatives were discussed  Consent given by: patient  Patient understanding: patient states understanding of the procedure being performed  Site marked: the operative site was marked  Radiology Images displayed and confirmed. If images not available, report reviewed: imaging studies available  Supporting Documentation  Indications: pain   Procedure Details  Location: hip - L hip joint  Preparation: Patient was prepped and draped in the usual sterile fashion  Needle size: 20 G  Ultrasound guidance: yes  Approach: anterior  Medications administered: 3 mL lidocaine 1 %; 80 mg triamcinolone acetonide 40 mg/mL    Patient tolerance: patient tolerated the procedure well with no immediate complications  Dressing:  Sterile dressing applied            Subjective:   Olesya Negrete is a 44 y.o. female who presents to the office for an ultrasound-guided left hip injection today.  She was seen in the office 2 weeks ago with anterior hip pain that is increasing over time and x-rays demonstrating severe degenerative changes.  She continues to have aching throbbing pain over the anterior aspect of the left hip.  She denies any new injury or trauma.      Review of Systems   Constitutional:  Negative for chills, fever and unexpected weight change.   HENT:  Negative for hearing loss, nosebleeds and sore throat.    Eyes:  Negative for pain, redness and visual disturbance.   Respiratory:  Negative for cough, shortness of breath and  wheezing.    Cardiovascular:  Negative for chest pain, palpitations and leg swelling.   Gastrointestinal:  Negative for abdominal pain, nausea and vomiting.   Endocrine: Negative for polydipsia and polyuria.   Genitourinary:  Negative for dysuria and hematuria.   Musculoskeletal:         See HPI   Skin:  Negative for rash and wound.   Neurological:  Negative for dizziness, numbness and headaches.   Psychiatric/Behavioral:  Negative for decreased concentration and suicidal ideas. The patient is not nervous/anxious.          History reviewed. No pertinent past medical history.    History reviewed. No pertinent surgical history.    Family History   Problem Relation Age of Onset    Stroke Mother     Heart attack Mother     Cancer Father     Diabetes Maternal Grandmother        Social History     Occupational History    Not on file   Tobacco Use    Smoking status: Never    Smokeless tobacco: Never   Vaping Use    Vaping status: Never Used   Substance and Sexual Activity    Alcohol use: Never    Drug use: Never    Sexual activity: Not on file         Current Outpatient Medications:     ferrous sulfate 324 (65 Fe) mg, Take 1 tablet (324 mg total) by mouth every other day, Disp: 45 tablet, Rfl: 0    pantoprazole (PROTONIX) 20 mg tablet, Take 1 tablet (20 mg total) by mouth daily before breakfast, Disp: 30 tablet, Rfl: 5    traMADol (Ultram) 50 mg tablet, Take 1 tablet (50 mg total) by mouth every 12 (twelve) hours as needed for moderate pain, Disp: 30 tablet, Rfl: 0    No Known Allergies    Objective:  There were no vitals filed for this visit.         Left Hip Exam     Tenderness   The patient is experiencing tenderness in the anterior.    Range of Motion   Internal rotation: abnormal     Other   Erythema: absent  Sensation: normal  Pulse: present            Physical Exam  Vitals and nursing note reviewed.   Constitutional:       Appearance: Normal appearance. She is well-developed.   HENT:      Head: Normocephalic and  atraumatic.      Right Ear: External ear normal.      Left Ear: External ear normal.   Eyes:      General: No scleral icterus.     Extraocular Movements: Extraocular movements intact.      Conjunctiva/sclera: Conjunctivae normal.   Cardiovascular:      Rate and Rhythm: Normal rate.   Pulmonary:      Effort: Pulmonary effort is normal. No respiratory distress.   Musculoskeletal:      Cervical back: Normal range of motion and neck supple.      Comments: See Ortho exam   Skin:     General: Skin is warm and dry.   Neurological:      General: No focal deficit present.      Mental Status: She is alert and oriented to person, place, and time.   Psychiatric:         Behavior: Behavior normal.               This document was created using speech voice recognition software.   Grammatical errors, random word insertions, pronoun errors, and incomplete sentences are an occasional consequence of this system due to software limitations, ambient noise, and hardware issues.   Any formal questions or concerns about content, text, or information contained within the body of this dictation should be directly addressed to the provider for clarification.

## 2025-02-27 ENCOUNTER — TELEPHONE (OUTPATIENT)
Age: 45
End: 2025-02-27

## 2025-02-27 NOTE — TELEPHONE ENCOUNTER
Calling regarding a bill asked to be transferred to billing. Provided number and transferred patient

## 2025-03-12 ENCOUNTER — TELEPHONE (OUTPATIENT)
Age: 45
End: 2025-03-12

## 2025-03-12 NOTE — TELEPHONE ENCOUNTER
Caller: Patient    Doctor: Dr. Jones    Reason for call:     Checking on referral for follow up visit, she needed one for being a new patient, she will call insurance to check for follow up visits.    Call back#: n/a

## 2025-03-13 ENCOUNTER — OFFICE VISIT (OUTPATIENT)
Dept: OBGYN CLINIC | Facility: CLINIC | Age: 45
End: 2025-03-13
Payer: COMMERCIAL

## 2025-03-13 VITALS — BODY MASS INDEX: 29.06 KG/M2 | WEIGHT: 148 LBS | HEIGHT: 60 IN

## 2025-03-13 DIAGNOSIS — M54.16 RADICULOPATHY, LUMBAR REGION: ICD-10-CM

## 2025-03-13 DIAGNOSIS — M16.12 PRIMARY OSTEOARTHRITIS OF ONE HIP, LEFT: Primary | ICD-10-CM

## 2025-03-13 PROCEDURE — 99214 OFFICE O/P EST MOD 30 MIN: CPT | Performed by: ORTHOPAEDIC SURGERY

## 2025-03-13 NOTE — PROGRESS NOTES
Assessment/Plan:  1. Primary osteoarthritis of one hip, left  Ambulatory referral to Orthopedic Surgery    Ambulatory referral to Physical Therapy      2. Radiculopathy, lumbar region  Ambulatory referral to Spine & Pain Management    Ambulatory referral to Physical Therapy        Olesya has left-sided anterior hip pain consistent with severe osteoarthritis.  This has not improved significantly with conservative measures and localized cortisone injection.  I recommended formal physical therapy as well as consultation with Dr. Simons to consider potential hip replacement in the near or distant future pending on her other options.  She also does have a history of lumbar radiculopathy and what sounds like a disc herniation with prior workup.  I recommended returning to physical therapy for lower back strengthening.  We also could consider consultation with our spine and pain team for consideration of epidural injection since it sounds like this was helpful for her in the past.  She agreed with this plan.  She will start PT and consult with my 2 colleagues in the near future.        Subjective:   Olesya Negrete is a 44 y.o. female who presents to the office for evaluation for left-sided hip pain.  She has a history of left hip pain for the last several months and increasing discomfort to the left groin with x-rays demonstrating severe osteoarthritis.  We did proceed with a ultrasound-guided left hip injection 3 weeks ago and she states this gave her a few days of relief but the pain now persists over the anterior portion of the hip and it did not help as much as she would like.  She continues to have aching pain deep within the hip that worsens with activity.  She does have a history of low back pain and lumbar radiculopathy for which she underwent physical therapy in 2023 and had an MRI of the lumbar spine but she does not have results with her today.      Review of Systems   Constitutional:  Negative for chills,  fever and unexpected weight change.   HENT:  Negative for hearing loss, nosebleeds and sore throat.    Eyes:  Negative for pain, redness and visual disturbance.   Respiratory:  Negative for cough, shortness of breath and wheezing.    Cardiovascular:  Negative for chest pain, palpitations and leg swelling.   Gastrointestinal:  Negative for abdominal pain, nausea and vomiting.   Endocrine: Negative for polydipsia and polyuria.   Genitourinary:  Negative for dysuria and hematuria.   Musculoskeletal:         See HPI   Skin:  Negative for rash and wound.   Neurological:  Negative for dizziness, numbness and headaches.   Psychiatric/Behavioral:  Negative for decreased concentration and suicidal ideas. The patient is not nervous/anxious.          History reviewed. No pertinent past medical history.    History reviewed. No pertinent surgical history.    Family History   Problem Relation Age of Onset    Stroke Mother     Heart attack Mother     Cancer Father     Diabetes Maternal Grandmother        Social History     Occupational History    Not on file   Tobacco Use    Smoking status: Never    Smokeless tobacco: Never   Vaping Use    Vaping status: Never Used   Substance and Sexual Activity    Alcohol use: Never    Drug use: Never    Sexual activity: Not on file         Current Outpatient Medications:     ferrous sulfate 324 (65 Fe) mg, Take 1 tablet (324 mg total) by mouth every other day, Disp: 45 tablet, Rfl: 0    pantoprazole (PROTONIX) 20 mg tablet, Take 1 tablet (20 mg total) by mouth daily before breakfast, Disp: 30 tablet, Rfl: 5    traMADol (Ultram) 50 mg tablet, Take 1 tablet (50 mg total) by mouth every 12 (twelve) hours as needed for moderate pain, Disp: 30 tablet, Rfl: 0    No Known Allergies    Objective:  There were no vitals filed for this visit.         Left Hip Exam     Tenderness   The patient is experiencing tenderness in the anterior.    Range of Motion   External rotation:  20 abnormal   Internal  rotation: 10 abnormal     Tests   SALOME: positive    Other   Erythema: absent  Sensation: normal  Pulse: present      Back Exam     Tenderness   The patient is experiencing tenderness in the lumbar.    Range of Motion   Extension:  normal   Flexion:  normal     Tests   Straight leg raise right: negative  Straight leg raise left: negative            Physical Exam  Vitals and nursing note reviewed.   Constitutional:       Appearance: Normal appearance. She is well-developed.   HENT:      Head: Normocephalic and atraumatic.      Right Ear: External ear normal.      Left Ear: External ear normal.   Eyes:      General: No scleral icterus.     Extraocular Movements: Extraocular movements intact.      Conjunctiva/sclera: Conjunctivae normal.   Cardiovascular:      Rate and Rhythm: Normal rate.   Pulmonary:      Effort: Pulmonary effort is normal. No respiratory distress.   Musculoskeletal:      Cervical back: Normal range of motion and neck supple.      Lumbar back: Negative right straight leg raise test and negative left straight leg raise test.      Comments: See Ortho exam   Skin:     General: Skin is warm and dry.   Neurological:      General: No focal deficit present.      Mental Status: She is alert and oriented to person, place, and time.   Psychiatric:         Behavior: Behavior normal.             This document was created using speech voice recognition software.   Grammatical errors, random word insertions, pronoun errors, and incomplete sentences are an occasional consequence of this system due to software limitations, ambient noise, and hardware issues.   Any formal questions or concerns about content, text, or information contained within the body of this dictation should be directly addressed to the provider for clarification.

## 2025-03-21 ENCOUNTER — CONSULT (OUTPATIENT)
Dept: PAIN MEDICINE | Facility: CLINIC | Age: 45
End: 2025-03-21
Payer: COMMERCIAL

## 2025-03-21 VITALS — BODY MASS INDEX: 29.06 KG/M2 | WEIGHT: 148 LBS | HEIGHT: 60 IN

## 2025-03-21 DIAGNOSIS — M16.12 PRIMARY OSTEOARTHRITIS OF LEFT HIP: ICD-10-CM

## 2025-03-21 DIAGNOSIS — M47.816 LUMBAR SPONDYLOSIS: Primary | ICD-10-CM

## 2025-03-21 PROCEDURE — 99204 OFFICE O/P NEW MOD 45 MIN: CPT | Performed by: STUDENT IN AN ORGANIZED HEALTH CARE EDUCATION/TRAINING PROGRAM

## 2025-03-21 RX ORDER — METHOCARBAMOL 500 MG/1
500 TABLET, FILM COATED ORAL 3 TIMES DAILY
Qty: 90 TABLET | Refills: 1 | Status: SHIPPED | OUTPATIENT
Start: 2025-03-21

## 2025-03-21 NOTE — PROGRESS NOTES
Assessment:  1. Lumbar spondylosis    2. Primary osteoarthritis of left hip        Plan:  No orders of the defined types were placed in this encounter.      New Medications Ordered This Visit   Medications    methocarbamol (ROBAXIN) 500 mg tablet     Sig: Take 1 tablet (500 mg total) by mouth 3 (three) times a day     Dispense:  90 tablet     Refill:  1       My impressions and treatment recommendations were discussed in detail with the patient, who verbalized understanding and had no further questions.    Olesya is a very pleasant 44-year-old female presents today for evaluation management of low back and left leg pain.  Patient has significant pain and giveaway weakness and based on her physical exam this is consistent with hip pathology.  I independently interpreted her pelvic radiographs showing severe osteoarthritis with hip dysplasia on the left.  I have also independently interpreted the patient's lumbar MRI showing disc desiccation at L4-5 with minor bulg without significant neuroforaminal narrowing.  Patient does have facet involvement on physical examination.  I do think the patient would benefit from diagnostic lumbar medial branch blocks at bilateral L4-5 and L5-S1 to ascertain if there is a lumbar component to the patient's overall pain.  If failure to improve with lumbar medial branch blocks we can attribute the patient's hip is the primary generator.  I encouraged the patient to establish care with Dr. Simons to discuss potential surgical options.    Complete risks and benefits including bleeding, infection, tissue reaction, nerve injury and allergic reaction were discussed. The approach was demonstrated using models and literature was provided. Verbal and written consent was obtained.    Discharge instructions were provided. I personally saw and examined the patient and I agree with the above discussed plan of care.    I have spent a total time of 42 minutes in caring for this patient on the day  of the visit/encounter including Diagnostic results, Prognosis, Risks and benefits of tx options, Instructions for management, Impressions, Documenting in the medical record, Reviewing/placing orders in the medical record (including tests, medications, and/or procedures), and Obtaining or reviewing history  .     History of Present Illness:    Olesya Negrete is a 44 y.o. female who presents to [facility] for initial evaluation of the above stated pain complaints. The patient has a past medical and chronic pain history as outlined in the assessment section. She was referred by Lc Jones, DO  [address] [address], [address]  [place] [address] .    44-year-old female with past medical history gastric ulcers and hip osteoarthritis presents today for evaluation and management of low back and left leg pain this been present for roughly 1 year.  She denies any inciting event.  Her pain is rated as severe and currently 10 out of 10.  She reports interference with daily activities.  The pain is constant without a typical pattern.  The pain is associate with burning and aching.  She reports weakness in her left lower limb.  Pain is located across the lumbosacral junction radiating down the anterior and posterior aspect of the patient's buttock thigh and groin.  Pain is made worse with lying down, standing, bending, and walking.  Patient has done physical therapy and has seen a chiropractor without any relief of her symptoms.  The patient is currently taking Tylenol and ibuprofen which is helpful.  The patient has taken meloxicam in the past.  The patient also taking IcyHot currently which is helpful.  The patient has taken Lyrica in the past which has been helpful.  Patient presents today for further evaluation.    Review of Systems:    Review of Systems   Constitutional:  Negative for fever and unexpected weight change.   HENT:  Negative for trouble swallowing.     Eyes:  Negative for visual disturbance.   Respiratory:  Negative for chest tightness, shortness of breath and wheezing.    Cardiovascular:  Negative for chest pain and palpitations.   Gastrointestinal:  Negative for constipation, diarrhea, nausea and vomiting.   Endocrine: Negative for cold intolerance, heat intolerance and polydipsia.   Genitourinary:  Negative for difficulty urinating and frequency.   Musculoskeletal:  Positive for back pain, gait problem and joint swelling. Negative for arthralgias, myalgias, neck pain and neck stiffness.   Skin:  Negative for rash.   Neurological:  Positive for weakness, numbness and headaches. Negative for dizziness, seizures, syncope and light-headedness.   Hematological:  Does not bruise/bleed easily.   Psychiatric/Behavioral:  Negative for dysphoric mood and sleep disturbance.    All other systems reviewed and are negative.          History reviewed. No pertinent past medical history.    History reviewed. No pertinent surgical history.    Family History   Problem Relation Age of Onset    Stroke Mother     Heart attack Mother     Cancer Father     Diabetes Maternal Grandmother        Social History     Occupational History    Not on file   Tobacco Use    Smoking status: Never    Smokeless tobacco: Never   Vaping Use    Vaping status: Never Used   Substance and Sexual Activity    Alcohol use: Never    Drug use: Never    Sexual activity: Not on file         Current Outpatient Medications:     ferrous sulfate 324 (65 Fe) mg, Take 1 tablet (324 mg total) by mouth every other day, Disp: 45 tablet, Rfl: 0    methocarbamol (ROBAXIN) 500 mg tablet, Take 1 tablet (500 mg total) by mouth 3 (three) times a day, Disp: 90 tablet, Rfl: 1    pantoprazole (PROTONIX) 20 mg tablet, Take 1 tablet (20 mg total) by mouth daily before breakfast, Disp: 30 tablet, Rfl: 5    traMADol (Ultram) 50 mg tablet, Take 1 tablet (50 mg total) by mouth every 12 (twelve) hours as needed for moderate pain  (Patient not taking: Reported on 3/21/2025), Disp: 30 tablet, Rfl: 0    No Known Allergies    Physical Exam:    Ht 5' (1.524 m)   Wt 67.1 kg (148 lb)   BMI 28.90 kg/m²     Constitutional: normal, well developed, well nourished, alert, in no distress and non-toxic and no overt pain behavior.  Eyes: anicteric  HEENT: grossly intact  Neck: supple, symmetric, trachea midline and no masses   Pulmonary:even and unlabored  Cardiovascular:No edema or pitting edema present  Skin:Normal without rashes or lesions and well hydrated  Psychiatric:Mood and affect appropriate  Neurologic:Cranial Nerves II-XII grossly intact  Musculoskeletal: Range of motion of the lumbar spine limited with extension.  Positive facet loading bilaterally.  Pain throughout the hip joint with positive Raj's radiating into the groin.  Positive Stinchfield's test on the left.  Strength within available range of motion preserved.  Sensation intact in the lower limbs bilaterally.    Imaging  No orders to display       No orders of the defined types were placed in this encounter.

## 2025-04-01 ENCOUNTER — TELEPHONE (OUTPATIENT)
Age: 45
End: 2025-04-01

## 2025-04-01 NOTE — TELEPHONE ENCOUNTER
Caller: Patient     Doctor: Dr. Ochoa     Reason for call: Patient needs to cancel procedure for tomorrow.     She will call back to reschedule     Call back#: [phone]

## 2025-04-01 NOTE — TELEPHONE ENCOUNTER
s/w patient, procedures r/s. Patient is going to confirm that she can get off of work those dates and is aware to call office if she cannot take those days off of work.

## 2025-04-07 ENCOUNTER — NURSE TRIAGE (OUTPATIENT)
Age: 45
End: 2025-04-07

## 2025-04-07 NOTE — TELEPHONE ENCOUNTER
"Reason for Disposition  • MILD dizziness (e.g., walking normally) and has NOT been evaluated by physician for this (Exception: Dizziness caused by heat exposure, sudden standing, or poor fluid intake.)    Answer Assessment - Initial Assessment Questions  1. DESCRIPTION: \"Describe your dizziness.\"      Patient felt like \"it was blurred\" last night went she went to bed last night, woke up in a morning with blurred vision, then it went away.   2. LIGHTHEADED: \"Do you feel lightheaded?\" (e.g., somewhat faint, woozy, weak upon standing)      Patient stated that she felt dizzy and tired last night and today   3. VERTIGO: \"Do you feel like either you or the room is spinning or tilting?\" (i.e., vertigo)      Unable to assess   4. SEVERITY: \"How bad is it?\"  \"Do you feel like you are going to faint?\" \"Can you stand and walk?\"      Mild   5. ONSET:  \"When did the dizziness begin?\"      Yesterday   6. AGGRAVATING FACTORS: \"Does anything make it worse?\" (e.g., standing, change in head position)      Laying down, changing the position   7. HEART RATE: \"Can you tell me your heart rate?\" \"How many beats in 15 seconds?\"  (Note: Not all patients can do this.)        84  8. CAUSE: \"What do you think is causing the dizziness?\" (e.g., decreased fluids or food, diarrhea, emotional distress, heat exposure, new medicine, sudden standing, vomiting; unknown)      Unknown, Patient is expecting her period.   9. RECURRENT SYMPTOM: \"Have you had dizziness before?\" If Yes, ask: \"When was the last time?\" \"What happened that time?\"      First time   10. OTHER SYMPTOMS: \"Do you have any other symptoms?\" (e.g., fever, chest pain, vomiting, diarrhea, bleeding)        Nausea last night, not today.   11. PREGNANCY: \"Is there any chance you are pregnant?\" \"When was your last menstrual period?\"        No    Answer Assessment - Initial Assessment Questions  1. DESCRIPTION: \"How has your vision changed?\" (e.g., complete vision loss, blurred vision, double " "vision, floaters, etc.)      Blurred vision before she went to bed last night and this morning. Symptom went away after patient got up of the the bed   4. ONSET: \"When did this begin?\" \"Did it start suddenly or has this been gradual?\"      Last night and this morning.   9. OTHER SYMPTOMS: \"Do you have any other symptoms?\" (e.g., confusion, headache, arm or leg weakness, speech problems)      Dizziness    Protocols used: Dizziness-Adult-OH, Vision Loss or Change-Adult-OH    "

## 2025-04-07 NOTE — TELEPHONE ENCOUNTER
FOLLOW UP: Patient has an appointment on 4/10.     REASON FOR CONVERSATION: Dizziness    SYMPTOMS: Dizziness, blurred vision last night and this morning, not now. Right now patient feels fatigued.     OTHER: patient was advised to go to ER if blurred vision or severe dizziness occurs.      DISPOSITION: See Within 3 Days in Office

## 2025-04-08 ENCOUNTER — OFFICE VISIT (OUTPATIENT)
Dept: FAMILY MEDICINE CLINIC | Facility: CLINIC | Age: 45
End: 2025-04-08
Payer: COMMERCIAL

## 2025-04-08 VITALS
HEART RATE: 88 BPM | SYSTOLIC BLOOD PRESSURE: 150 MMHG | TEMPERATURE: 97.9 F | DIASTOLIC BLOOD PRESSURE: 88 MMHG | RESPIRATION RATE: 16 BRPM | OXYGEN SATURATION: 100 % | WEIGHT: 149.4 LBS | HEIGHT: 60 IN | BODY MASS INDEX: 29.33 KG/M2

## 2025-04-08 DIAGNOSIS — G89.29 CHRONIC LEFT HIP PAIN: ICD-10-CM

## 2025-04-08 DIAGNOSIS — D50.9 IRON DEFICIENCY ANEMIA, UNSPECIFIED IRON DEFICIENCY ANEMIA TYPE: ICD-10-CM

## 2025-04-08 DIAGNOSIS — H81.13 BENIGN PAROXYSMAL POSITIONAL VERTIGO DUE TO BILATERAL VESTIBULAR DISORDER: Primary | ICD-10-CM

## 2025-04-08 DIAGNOSIS — M25.552 CHRONIC LEFT HIP PAIN: ICD-10-CM

## 2025-04-08 DIAGNOSIS — Z12.31 ENCOUNTER FOR SCREENING MAMMOGRAM FOR MALIGNANT NEOPLASM OF BREAST: ICD-10-CM

## 2025-04-08 DIAGNOSIS — H61.23 CERUMEN DEBRIS ON TYMPANIC MEMBRANE OF BOTH EARS: ICD-10-CM

## 2025-04-08 PROCEDURE — 99214 OFFICE O/P EST MOD 30 MIN: CPT | Performed by: STUDENT IN AN ORGANIZED HEALTH CARE EDUCATION/TRAINING PROGRAM

## 2025-04-08 RX ORDER — METHYLPREDNISOLONE 4 MG/1
TABLET ORAL
Qty: 21 EACH | Refills: 0 | Status: SHIPPED | OUTPATIENT
Start: 2025-04-08

## 2025-04-08 RX ORDER — MOXIFLOXACIN 5 MG/ML
SOLUTION/ DROPS OPHTHALMIC
COMMUNITY
Start: 2025-04-02

## 2025-04-08 NOTE — PROGRESS NOTES
Name: Olesya Negrete      : 1980      MRN: [id number]  Encounter Provider: Wil De Oliveira DO  Encounter Date: 2025   Encounter department: [facility]  :  Assessment & Plan  Benign paroxysmal positional vertigo due to bilateral vestibular disorder  Symptoms consistent with BPPV, recommend low-dose steroid to help inflammation, no red flag symptoms.  Orders:  •  methylPREDNISolone 4 MG tablet therapy pack; Use as directed on package    Cerumen debris on tympanic membrane of both ears  Debrox twice daily, recheck on Friday for ear lavage and to monitor BPPV symptoms  Orders:  •  carbamide peroxide (DEBROX) 6.5 % otic solution; Administer 5 drops into both ears 2 (two) times a day    Encounter for screening mammogram for malignant neoplasm of breast    Orders:  •  Mammo screening bilateral w 3d and cad; Future    Chronic left hip pain  Continue close follow-up with spine and pain management       Iron deficiency anemia, unspecified iron deficiency anemia type  Continue iron supplementation              History of Present Illness   Acute care visit, dizziness especially with positional changes.      Review of Systems   Constitutional:  Negative for chills and fever.   HENT:  Negative for ear pain and sore throat.    Eyes:  Negative for pain and visual disturbance.   Respiratory:  Negative for cough and shortness of breath.    Cardiovascular:  Negative for chest pain and palpitations.   Gastrointestinal:  Negative for abdominal pain and vomiting.   Genitourinary:  Negative for dysuria and hematuria.   Musculoskeletal:  Negative for arthralgias and back pain.   Skin:  Negative for color change and rash.   Neurological:  Positive for dizziness. Negative for seizures and syncope.   Psychiatric/Behavioral:  Negative for agitation, behavioral problems and confusion.    All other systems reviewed and are negative.      Objective   /88 (BP Location: Left arm, Patient Position:  Sitting, Cuff Size: Standard)   Pulse 88   Temp 97.9 °F (36.6 °C) (Temporal)   Resp 16   Ht 5' (1.524 m)   Wt 67.8 kg (149 lb 6.4 oz)   LMP 04/05/2025   SpO2 100%   BMI 29.18 kg/m²      Physical Exam  Vitals and nursing note reviewed.   Constitutional:       General: She is not in acute distress.     Appearance: She is well-developed.   HENT:      Head: Normocephalic and atraumatic.   Eyes:      General: No scleral icterus.     Conjunctiva/sclera: Conjunctivae normal.   Cardiovascular:      Rate and Rhythm: Normal rate and regular rhythm.      Pulses: Normal pulses.      Heart sounds: No murmur heard.  Pulmonary:      Effort: Pulmonary effort is normal. No respiratory distress.      Breath sounds: Normal breath sounds.   Abdominal:      General: Bowel sounds are normal. There is no distension.      Palpations: Abdomen is soft.      Tenderness: There is no abdominal tenderness.   Musculoskeletal:         General: No swelling. Normal range of motion.      Cervical back: Neck supple.   Skin:     General: Skin is warm and dry.      Capillary Refill: Capillary refill takes less than 2 seconds.   Neurological:      General: No focal deficit present.      Mental Status: She is alert and oriented to person, place, and time. Mental status is at baseline.   Psychiatric:         Mood and Affect: Mood normal.         Behavior: Behavior normal.

## 2025-04-30 ENCOUNTER — HOSPITAL ENCOUNTER (OUTPATIENT)
Facility: AMBULARY SURGERY CENTER | Age: 45
Setting detail: OUTPATIENT SURGERY
Discharge: HOME | End: 2025-04-30
Attending: STUDENT IN AN ORGANIZED HEALTH CARE EDUCATION/TRAINING PROGRAM | Admitting: STUDENT IN AN ORGANIZED HEALTH CARE EDUCATION/TRAINING PROGRAM
Payer: COMMERCIAL

## 2025-04-30 ENCOUNTER — APPOINTMENT (OUTPATIENT)
Dept: RADIOLOGY | Facility: HOSPITAL | Age: 45
End: 2025-04-30
Payer: COMMERCIAL

## 2025-04-30 VITALS
HEIGHT: 60 IN | TEMPERATURE: 97.8 F | WEIGHT: 149 LBS | HEART RATE: 76 BPM | DIASTOLIC BLOOD PRESSURE: 92 MMHG | RESPIRATION RATE: 2 BRPM | OXYGEN SATURATION: 100 % | SYSTOLIC BLOOD PRESSURE: 155 MMHG | BODY MASS INDEX: 29.25 KG/M2

## 2025-04-30 PROBLEM — M47.816 LUMBAR SPONDYLOSIS: Status: ACTIVE | Noted: 2025-04-30

## 2025-04-30 PROCEDURE — 64494 INJ PARAVERT F JNT L/S 2 LEV: CPT | Performed by: STUDENT IN AN ORGANIZED HEALTH CARE EDUCATION/TRAINING PROGRAM

## 2025-04-30 PROCEDURE — 64493 INJ PARAVERT F JNT L/S 1 LEV: CPT | Performed by: STUDENT IN AN ORGANIZED HEALTH CARE EDUCATION/TRAINING PROGRAM

## 2025-04-30 RX ORDER — BUPIVACAINE HYDROCHLORIDE 5 MG/ML
INJECTION, SOLUTION EPIDURAL; INTRACAUDAL; PERINEURAL AS NEEDED
Status: DISCONTINUED | OUTPATIENT
Start: 2025-04-30 | End: 2025-04-30 | Stop reason: HOSPADM

## 2025-04-30 RX ORDER — LIDOCAINE HYDROCHLORIDE 10 MG/ML
INJECTION, SOLUTION EPIDURAL; INFILTRATION; INTRACAUDAL; PERINEURAL AS NEEDED
Status: DISCONTINUED | OUTPATIENT
Start: 2025-04-30 | End: 2025-04-30 | Stop reason: HOSPADM

## 2025-04-30 NOTE — DISCHARGE INSTRUCTIONS

## 2025-04-30 NOTE — OP NOTE
OPERATIVE REPORT  PATIENT NAME: Olesya Negrete    :  1980  MRN: [id number]  Pt Location: [facility] MINOR/PAIN ROOM 01    SURGERY DATE: 2025    Surgeons and Role:     * Shailesh Ochoa DO - Primary    Preop Diagnosis:  Lumbar spondylosis [M47.816]    Post-Op Diagnosis Codes:     * Lumbar spondylosis [M47.816]    Procedure(s):  Bilateral - BILATERAL L4-L5 L5-S1 MEDIAL BRANCH BLOCK #2    Specimen(s):  * No specimens in log *    Estimated Blood Loss:   Minimal    Drains:  * No LDAs found *    Anesthesia Type:   Local    Operative Indications:  Lumbar spondylosis [M47.816]        PROCEDURE:  The patient gave informed written consent to proceed with this procedure following a detailed discussion of the risks and benefits associated with lumbar medial branch blocks at bilateral L4-5 L5S1 including but not limited to infection, bleeding, headache, further exacerbation of current symptoms, neurological injury, and lack of efficacy.  The patient was then placed in prone position, the skin over the lumbar region was prepped with chlorhexadine, and the site was marked and draped with sterile towels.  Strict sterile technique was maintained throughout the procedure.  A time out was performed with full staff present to identify the patient, verify the procedure being performed, and review allergies    Flouroscopy was used to identify the appropriate lumbar anatomy.  The skin and subcutaneous tissue were anesthetized with 1% lidocaine.  A 22g 3.5 inch  spinal needle was inserted under fluoroscopic guidance to contact the junction of the superior articulating process and transverse process at the aformentioned levels. Final needle position was confirmed with fluoroscopy.  Aspiration was negative for blood or CSF.  Next, 0.5 ml of 0.5% bupivacaine was injected at each site, the needles were restyletted and withdrawn.  Fluoroscopic spot images were saved during the procedure.     There was no paresthesia during needle  placement and aspiration was negative at all times.  The patient tolerated the procedure well and there were no apparent complications.  Written and verbal discharge instructions were reviewed with the patient prior to discharge.  They will report the results of today's diagnostic injection via telephone within 24 hours     SIGNATURE: Shailesh Ochoa DO  DATE: April 30, 2025  TIME: 7:21 AM

## 2025-04-30 NOTE — H&P
History of Present Illness: The patient is a 44 y.o. female who presents with complaints of low back pain    History reviewed. No pertinent past medical history.    History reviewed. No pertinent surgical history.    No current facility-administered medications for this encounter.    No Known Allergies    Physical Exam:   Vitals:    04/30/25 0719   BP: 155/92   Pulse: 83   Resp: 16   Temp: 97.8 °F (36.6 °C)   SpO2: 100%     General: Awake, Alert, Oriented x 3, Mood and affect appropriate  Respiratory: Respirations even and unlabored  Cardiovascular: Peripheral pulses intact; no edema  Musculoskeletal Exam: tenderness to lumbar paraspinals with positive facet loading    ASA Score: 2         Assessment: lumbar spondylosis    Plan:  proceed with L4-5 L5S1 MBB #1

## 2025-05-07 ENCOUNTER — APPOINTMENT (OUTPATIENT)
Dept: RADIOLOGY | Facility: HOSPITAL | Age: 45
End: 2025-05-07
Payer: COMMERCIAL

## 2025-05-07 ENCOUNTER — HOSPITAL ENCOUNTER (OUTPATIENT)
Facility: AMBULARY SURGERY CENTER | Age: 45
Setting detail: OUTPATIENT SURGERY
Discharge: HOME | End: 2025-05-07
Attending: STUDENT IN AN ORGANIZED HEALTH CARE EDUCATION/TRAINING PROGRAM | Admitting: STUDENT IN AN ORGANIZED HEALTH CARE EDUCATION/TRAINING PROGRAM
Payer: COMMERCIAL

## 2025-05-07 VITALS
OXYGEN SATURATION: 100 % | DIASTOLIC BLOOD PRESSURE: 80 MMHG | HEART RATE: 74 BPM | SYSTOLIC BLOOD PRESSURE: 121 MMHG | RESPIRATION RATE: 18 BRPM | TEMPERATURE: 98.2 F | BODY MASS INDEX: 29.25 KG/M2 | WEIGHT: 149 LBS | HEIGHT: 60 IN

## 2025-05-07 PROCEDURE — 64493 INJ PARAVERT F JNT L/S 1 LEV: CPT | Performed by: STUDENT IN AN ORGANIZED HEALTH CARE EDUCATION/TRAINING PROGRAM

## 2025-05-07 PROCEDURE — 64494 INJ PARAVERT F JNT L/S 2 LEV: CPT | Performed by: STUDENT IN AN ORGANIZED HEALTH CARE EDUCATION/TRAINING PROGRAM

## 2025-05-07 RX ORDER — BUPIVACAINE HYDROCHLORIDE 5 MG/ML
INJECTION, SOLUTION EPIDURAL; INTRACAUDAL; PERINEURAL AS NEEDED
Status: DISCONTINUED | OUTPATIENT
Start: 2025-05-07 | End: 2025-05-07 | Stop reason: HOSPADM

## 2025-05-07 RX ORDER — LIDOCAINE HYDROCHLORIDE 10 MG/ML
INJECTION, SOLUTION EPIDURAL; INFILTRATION; INTRACAUDAL; PERINEURAL AS NEEDED
Status: DISCONTINUED | OUTPATIENT
Start: 2025-05-07 | End: 2025-05-07 | Stop reason: HOSPADM

## 2025-05-07 NOTE — INTERVAL H&P NOTE
H&P reviewed. After examining the patient I find no changes in the patients condition since the H&P had been written.    Vitals:    05/07/25 1317   BP: 137/84   Pulse: 87   Resp: 16   Temp: 98.2 °F (36.8 °C)   SpO2: 100%

## 2025-05-07 NOTE — DISCHARGE INSTRUCTIONS

## 2025-05-07 NOTE — OP NOTE
OPERATIVE REPORT  PATIENT NAME: Olesya Negrete    :  1980  MRN: [id number]  Pt Location: [facility] MINOR/PAIN ROOM 01    SURGERY DATE: 2025    Surgeons and Role:     * Shailesh Ochoa DO - Primary    Preop Diagnosis:  Lumbar spondylosis [M47.816]    Post-Op Diagnosis Codes:     * Lumbar spondylosis [M47.816]    Procedure(s):  Bilateral - BILATERAL L4-L5 L5-S1 MEDIAL BRANCH BLOCK #1    Specimen(s):  * No specimens in log *    Estimated Blood Loss:   Minimal    Drains:  * No LDAs found *    Anesthesia Type:   Local    Operative Indications:  Lumbar spondylosis [M47.816]        PROCEDURE:  The patient gave informed written consent to proceed with this procedure following a detailed discussion of the risks and benefits associated with lumbar medial branch blocks at bilateral L4-5 L5-S1 including but not limited to infection, bleeding, headache, further exacerbation of current symptoms, neurological injury, and lack of efficacy.  The patient was then placed in prone position, the skin over the lumbar region was prepped with chlorhexadine, and the site was marked and draped with sterile towels.  Strict sterile technique was maintained throughout the procedure.  A time out was performed with full staff present to identify the patient, verify the procedure being performed, and review allergies    Flouroscopy was used to identify the appropriate lumbar anatomy.  The skin and subcutaneous tissue were anesthetized with 1% lidocaine.  A 22g 3.5 inch  spinal needle was inserted under fluoroscopic guidance to contact the junction of the superior articulating process and transverse process at the aformentioned levels. Final needle position was confirmed with fluoroscopy.  Aspiration was negative for blood or CSF.  Next, 0.5 ml of 0.5% bupivacaine was injected at each site, the needles were restyletted and withdrawn.  Fluoroscopic spot images were saved during the procedure.     There was no paresthesia during needle  placement and aspiration was negative at all times.  The patient tolerated the procedure well and there were no apparent complications.  Written and verbal discharge instructions were reviewed with the patient prior to discharge.  They will report the results of today's diagnostic injection via telephone within 24 hours     SIGNATURE: Shailesh Ochoa,   DATE: May 7, 2025  TIME: 1:42 PM

## 2025-07-24 ENCOUNTER — OFFICE VISIT (OUTPATIENT)
Dept: OBGYN CLINIC | Facility: CLINIC | Age: 45
End: 2025-07-24
Payer: COMMERCIAL

## 2025-07-24 VITALS — HEIGHT: 60 IN | WEIGHT: 151 LBS | BODY MASS INDEX: 29.64 KG/M2

## 2025-07-24 DIAGNOSIS — M25.552 LEFT HIP PAIN: ICD-10-CM

## 2025-07-24 DIAGNOSIS — M16.32 OSTEOARTHRITIS RESULTING FROM LEFT HIP DYSPLASIA: Primary | ICD-10-CM

## 2025-07-24 PROCEDURE — 99214 OFFICE O/P EST MOD 30 MIN: CPT | Performed by: ORTHOPAEDIC SURGERY

## 2025-07-24 NOTE — PROGRESS NOTES
Name: Olseya Negrete      : 1980      MRN: [id number]  Encounter Provider: Faisal Simons DO  Encounter Date: 2025   Encounter department: 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 female who presents for initial evaluation of the left hip. She is symptomatic of left hip osteoarthritis secondary to dysplasia. We discussed non operative treatment versus operative treatment. Non operative treatment includes physical therapy, oral analgesics or anti-inflammatories, and intra-articular corticosteroid injections. Operative treatment includes left total hip arthroplasty. Given her age, it is possible if she receives a left total hip arthoplasty now, she may need subsequent revisions as she gets older.  Despite this she has failed other conservative measures treatment options and surgical intervention would be the next reasonable step.  She will continue her own research and discuss with her family. She will follow-up as needed if she would like to proceed with either non operative or operative treatment.    Return if symptoms worsen or fail to improve.    I have personally reviewed pertinent imaging.  X-rays of the left hip obtained on 2025 demonstrate bilateral hip dysplasia. Severe degenerative changes of left hip. No acute fractures, dislocations, lytic or blastic lesions.    History: Olesya Negrete is a 44 y.o. female who presents for initial evaluation of the left hip. She was referred to me by Dr. Lc Jones. She has known severe left hip osteoarthritis that Dr. Jones had been managing. She received an intra-articular left hip corticosteroid injection on 2025. She recalls this provided mild relief. She states her  pain in the left hip increased when she has her menstrual period. She had two medial branch blocks bilaterally at L4-L5 with Dr. Jeremy Ochoa, with some relief of her posterior pain. Today, she indicates her pain is located in the groin. She reports referred pain in the left knee. She takes Advil for pain, which helps some. She would like to discuss her treatment options.      Estimated body mass index is 29.49 kg/m² as calculated from the following:    Height as of this encounter: 5' (1.524 m).    Weight as of this encounter: 68.5 kg (151 lb).    Lab Results   Component Value Date    HGBA1C 5.3 02/08/2025       Social History     Occupational History    Not on file   Tobacco Use    Smoking status: Never    Smokeless tobacco: Never   Vaping Use    Vaping status: Never Used   Substance and Sexual Activity    Alcohol use: Never    Drug use: Never    Sexual activity: Not on file       Objective:  Left Hip Exam     Range of Motion   Abduction:  50   Adduction:  20 (painful)   Flexion:  90   External rotation:  60   Internal rotation: 20     Muscle Strength   Flexion: 4/5     Other   Erythema: absent  Scars: absent  Sensation: normal  Pulse: present    Comments:  Small overhanging abdominal pannus  Positive Stinchfield  Increased obligatory ER at rest compared to right side  2+ TP and DP pulses with brisk capillary refill to the toes  Sural, saphenous, tibial, superficial and deep peroneal motor and sensory distribution intact  Sensation intact to light touch              There were no vitals filed for this visit.    Subjective:  Past Medical History[1]    Past Surgical History[2]    Family History[3]    Current Medications[4]    Allergies[5]    Review of Systems   Constitutional:  Positive for activity change. Negative for chills, fever and unexpected weight change.   HENT:  Negative for hearing loss, nosebleeds and sore throat.    Eyes:  Negative for pain, redness and visual disturbance.   Respiratory:  Negative for  cough, shortness of breath and wheezing.    Cardiovascular:  Negative for chest pain, palpitations and leg swelling.   Gastrointestinal:  Negative for abdominal pain, nausea and vomiting.   Endocrine: Negative for polydipsia and polyuria.   Genitourinary:  Negative for dysuria and hematuria.   Musculoskeletal:  See HPI  Skin:  Negative for rash and wound.   Neurological:  Negative for dizziness, numbness and headaches.   Psychiatric/Behavioral:  Negative for decreased concentration and suicidal ideas. The patient is not nervous/anxious.      Physical Exam  Vitals and nursing note reviewed.   Constitutional:       Appearance: Normal appearance. She is well-developed.   HENT:      Head: Normocephalic and atraumatic.      Right Ear: External ear normal.      Left Ear: External ear normal.   Eyes:      General: No scleral icterus.     Extraocular Movements: Extraocular movements intact.      Conjunctiva/sclera: Conjunctivae normal.   Cardiovascular:      Rate and Rhythm: Normal rate.   Pulmonary:      Effort: Pulmonary effort is normal. No respiratory distress.   Musculoskeletal:      Cervical back: Normal range of motion and neck supple.      Comments: See Ortho exam   Skin:     General: Skin is warm and dry.   Neurological:      General: No focal deficit present.      Mental Status: She is alert and oriented to person, place, and time.   Psychiatric:         Behavior: Behavior normal.     Scribe Attestation      I,:  Francine Zuniga am acting as a scribe while in the presence of the attending physician.:       I,:  Faisal Simons, DO personally performed the services described in this documentation    as scribed in my presence.:           This document was created using speech voice recognition software.   Grammatical errors, random word insertions, pronoun errors, and incomplete sentences are an occasional consequence of this system due to software limitations, ambient noise, and hardware issues.   Any formal questions or  concerns about content, text, or information contained within the body of this dictation should be directly addressed to the provider for clarification.         [1] No past medical history on file.  [2]   Past Surgical History:  Procedure Laterality Date    NERVE BLOCK Bilateral 4/30/2025    Procedure: BILATERAL L4-L5 L5-S1 MEDIAL BRANCH BLOCK #2;  Surgeon: Shailesh Ochoa DO;  Location: [facility] MAIN OR;  Service: Pain Management     NERVE BLOCK Bilateral 5/7/2025    Procedure: BILATERAL L4-L5 L5-S1 MEDIAL BRANCH BLOCK #2;  Surgeon: Shailesh Ochoa DO;  Location: [facility] MAIN OR;  Service: Pain Management    [3]   Family History  Problem Relation Name Age of Onset    Stroke Mother      Heart attack Mother      Cancer Father      Diabetes Maternal Grandmother     [4]   Current Outpatient Medications:     carbamide peroxide (DEBROX) 6.5 % otic solution, Administer 5 drops into both ears 2 (two) times a day, Disp: 15 mL, Rfl: 0    ferrous sulfate 324 (65 Fe) mg, Take 1 tablet (324 mg total) by mouth every other day, Disp: 45 tablet, Rfl: 0    methylPREDNISolone 4 MG tablet therapy pack, Use as directed on package, Disp: 21 each, Rfl: 0    moxifloxacin (VIGAMOX) 0.5 % ophthalmic solution, , Disp: , Rfl:     traMADol (Ultram) 50 mg tablet, Take 1 tablet (50 mg total) by mouth every 12 (twelve) hours as needed for moderate pain (Patient not taking: Reported on 3/21/2025), Disp: 30 tablet, Rfl: 0  [5] No Known Allergies

## (undated) DEVICE — GLOVE SRG LF STRL BGL SKNSNS 8 PF

## (undated) DEVICE — PLASTIC ADHESIVE BANDAGE: Brand: CURITY

## (undated) DEVICE — SYRINGE 3ML LL

## (undated) DEVICE — NEEDLE SPINAL 22G X 3.5IN  QUINCKE

## (undated) DEVICE — TRAY PAIN SUPPORT

## (undated) DEVICE — TOWEL SURG XR DETECT GREEN STRL RFD

## (undated) DEVICE — CHLORAPREP HI-LITE 10.5ML ORANGE

## (undated) DEVICE — FLEXIBLE ADHESIVE BANDAGE,X-LARGE: Brand: CURITY